# Patient Record
Sex: FEMALE | Race: WHITE | NOT HISPANIC OR LATINO | Employment: UNEMPLOYED | URBAN - METROPOLITAN AREA
[De-identification: names, ages, dates, MRNs, and addresses within clinical notes are randomized per-mention and may not be internally consistent; named-entity substitution may affect disease eponyms.]

---

## 2017-01-12 ENCOUNTER — OFFICE VISIT (OUTPATIENT)
Dept: INTERNAL MEDICINE | Facility: CLINIC | Age: 57
End: 2017-01-12
Payer: COMMERCIAL

## 2017-01-12 ENCOUNTER — HOSPITAL ENCOUNTER (OUTPATIENT)
Dept: RADIOLOGY | Facility: HOSPITAL | Age: 57
Discharge: HOME | End: 2017-01-12
Attending: NURSE PRACTITIONER
Payer: COMMERCIAL

## 2017-01-12 VITALS
SYSTOLIC BLOOD PRESSURE: 138 MMHG | BODY MASS INDEX: 24.3 KG/M2 | HEART RATE: 101 BPM | RESPIRATION RATE: 18 BRPM | OXYGEN SATURATION: 95 % | DIASTOLIC BLOOD PRESSURE: 88 MMHG | HEIGHT: 56 IN | WEIGHT: 108 LBS

## 2017-01-12 DIAGNOSIS — E04.1 THYROID NODULE: Primary | ICD-10-CM

## 2017-01-12 DIAGNOSIS — J02.9 PHARYNGITIS, UNSPECIFIED ETIOLOGY: ICD-10-CM

## 2017-01-12 DIAGNOSIS — E07.89 PAINFUL THYROID: ICD-10-CM

## 2017-01-12 DIAGNOSIS — E07.89 PAINFUL THYROID: Primary | ICD-10-CM

## 2017-01-12 PROCEDURE — 99999 PR PBB SHADOW E&M-EST. PATIENT-LVL III: CPT | Mod: PBBFAC,,, | Performed by: NURSE PRACTITIONER

## 2017-01-12 PROCEDURE — 76536 US EXAM OF HEAD AND NECK: CPT | Mod: 26,,, | Performed by: RADIOLOGY

## 2017-01-12 PROCEDURE — 96372 THER/PROPH/DIAG INJ SC/IM: CPT | Mod: S$GLB,,, | Performed by: NURSE PRACTITIONER

## 2017-01-12 PROCEDURE — 99204 OFFICE O/P NEW MOD 45 MIN: CPT | Mod: S$GLB,,, | Performed by: NURSE PRACTITIONER

## 2017-01-12 PROCEDURE — 1159F MED LIST DOCD IN RCRD: CPT | Mod: S$GLB,,, | Performed by: NURSE PRACTITIONER

## 2017-01-12 PROCEDURE — 76536 US EXAM OF HEAD AND NECK: CPT | Mod: TC

## 2017-01-12 RX ORDER — METHYLPREDNISOLONE ACETATE 80 MG/ML
80 INJECTION, SUSPENSION INTRA-ARTICULAR; INTRALESIONAL; INTRAMUSCULAR; SOFT TISSUE
Status: COMPLETED | OUTPATIENT
Start: 2017-01-12 | End: 2017-01-12

## 2017-01-12 RX ORDER — FERROUS SULFATE, DRIED 160(50) MG
1 TABLET, EXTENDED RELEASE ORAL 2 TIMES DAILY WITH MEALS
COMMUNITY
End: 2017-10-10

## 2017-01-12 RX ORDER — AMOXICILLIN AND CLAVULANATE POTASSIUM 875; 125 MG/1; MG/1
1 TABLET, FILM COATED ORAL 2 TIMES DAILY
Qty: 20 TABLET | Refills: 0 | Status: SHIPPED | OUTPATIENT
Start: 2017-01-12 | End: 2017-01-22

## 2017-01-12 RX ADMIN — METHYLPREDNISOLONE ACETATE 80 MG: 80 INJECTION, SUSPENSION INTRA-ARTICULAR; INTRALESIONAL; INTRAMUSCULAR; SOFT TISSUE at 02:01

## 2017-01-12 NOTE — PROGRESS NOTES
Subjective:       Patient ID: Cynthia Zarco is a 56 y.o. female.    Chief Complaint: Sore Throat    HPI: Pt presents to clinic today new to me and clinic with c/o throat pain. She reports that she started with a pain a couple weeks ago and then in last couple days she reports that it has gotten much worse. She reports that she can not swallow. She reports that she has not nasal congestion. Low grade fever. + fatigue. No wt loss  Review of Systems   Constitutional: Positive for fatigue and fever. Negative for chills.   HENT: Positive for sore throat and trouble swallowing. Negative for congestion, sinus pressure and voice change.    Respiratory: Negative for cough and shortness of breath.    Cardiovascular: Negative for chest pain and palpitations.   Gastrointestinal: Negative for constipation, diarrhea, nausea and vomiting.   Genitourinary: Negative for dysuria, frequency, hematuria and urgency.   Musculoskeletal: Negative for back pain and myalgias.   Skin: Negative for rash and wound.   Neurological: Negative for weakness and light-headedness.   Psychiatric/Behavioral: Negative for agitation and suicidal ideas. The patient is not nervous/anxious.        Objective:      Physical Exam   Constitutional: She is oriented to person, place, and time. She appears well-developed and well-nourished.   HENT:   Head: Normocephalic and atraumatic.   Right Ear: External ear normal.   Left Ear: External ear normal.   Nose: Nose normal.   Mouth/Throat: Oropharynx is clear and moist.   Eyes: Conjunctivae and EOM are normal. Pupils are equal, round, and reactive to light.   Neck: Normal range of motion. Neck supple. Thyromegaly (she has a mildly enlarged thyroid but c/o pain  in specific area that moves up and down with palp during swallow) present.   Cardiovascular: Normal rate, regular rhythm, normal heart sounds and intact distal pulses.    No murmur heard.  Pulmonary/Chest: Effort normal and breath sounds normal. No  respiratory distress. She has no wheezes.   Abdominal: Soft. Bowel sounds are normal. She exhibits no distension and no mass. There is no tenderness. There is no rebound and no guarding.   Musculoskeletal: Normal range of motion. She exhibits no tenderness.   Neurological: She is alert and oriented to person, place, and time.   Skin: Skin is warm and dry.   Psychiatric: She has a normal mood and affect. Her behavior is normal. Judgment and thought content normal.       Assessment:       1. Painful thyroid    2. Pharyngitis, unspecified etiology        Plan:   Cynthia was seen today for sore throat.    Diagnoses and all orders for this visit:    Painful thyroid  -     US Soft Tissue Head Neck Thyroid; Future  -     TSH; Future  -     CBC auto differential; Future  -     Comprehensive metabolic panel; Future    Pharyngitis, unspecified etiology  -     methylPREDNISolone acetate injection 80 mg; Inject 1 mL (80 mg total) into the muscle one time. Still swallowing spit, no breathing difficulty  -     amoxicillin-clavulanate 875-125mg (AUGMENTIN) 875-125 mg per tablet; Take 1 tablet by mouth 2 (two) times daily.  get labs now, spoke with radiology will u/s today at 345

## 2017-01-12 NOTE — MR AVS SNAPSHOT
[facility]  [address]  [place] [address]  Phone: [phone]  Fax: [phone]                  Cynthia Zarco   2017 2:00 PM   Office Visit    Description:  Female : 1960   Provider:  Sanaz Mcknight NP   Department:  [facility]           Reason for Visit     Sore Throat           Diagnoses this Visit        Comments    Painful thyroid    -  Primary     Pharyngitis, unspecified etiology                To Do List           Future Appointments        Provider Department Dept Phone    2017 3:45 PM [facility] US[facility] [phone]      Goals (5 Years of Data)     None      Follow-Up and Disposition     Return if symptoms worsen or fail to improve.    Follow-up and Disposition History       These Medications        Disp Refills Start End    amoxicillin-clavulanate 875-125mg (AUGMENTIN) 875-125 mg per tablet 20 tablet 0 2017    Take 1 tablet by mouth 2 (two) times daily. - Oral    Pharmacy: [facility]/pharmacy #[id number] - [place] - [address] [address] Ph #: [phone]         [facility]     [facility] Care [facility] -  Assistance  Registered nurses in the [facility] provide clinical advisement, health education, appointment booking, and other advisory services.  Call for this free service at [phone].             Medications           Message regarding Medications     Verify the changes and/or additions to your medication regime listed below are the same as discussed with your clinician today.  If any of these changes or additions are incorrect, please notify your healthcare provider.        START taking these NEW medications        Refills    amoxicillin-clavulanate 875-125mg (AUGMENTIN) 875-125 mg per tablet 0    Sig: Take 1 tablet by mouth 2 (two) times daily.    Class: Normal    Route: Oral      These medications were administered today        Dose Freq    methylPREDNISolone acetate  "injection 80 mg 80 mg Clinic/HOD 1 time    Sig: Inject 1 mL (80 mg total) into the muscle one time.    Class: Normal    Route: Intramuscular           Verify that the below list of medications is an accurate representation of the medications you are currently taking.  If none reported, the list may be blank. If incorrect, please contact your healthcare provider. Carry this list with you in case of emergency.           Current Medications     calcium-vitamin D3 (CALCIUM 500 + D) 500 mg(1,250mg) -200 unit per tablet Take 1 tablet by mouth 2 (two) times daily with meals.    multivitamin (THERAGRAN) per tablet Take by mouth. 1 Tablet Oral Every day    amoxicillin-clavulanate 875-125mg (AUGMENTIN) 875-125 mg per tablet Take 1 tablet by mouth 2 (two) times daily.    ESTRACE 0.01 % (0.1 mg/gram) vaginal cream Place 1 g vaginally once daily.           Clinical Reference Information           Vital Signs - Last Recorded  Most recent update: 1/12/2017  2:11 PM by Theresa Torres MA    BP Pulse Resp Ht Wt LMP    138/88 101 18 4' 8" (1.422 m) 49 kg (108 lb 0.4 oz) 08/22/2008    SpO2 BMI             95% 24.22 kg/m2         Blood Pressure          Most Recent Value    BP  138/88      Allergies as of 1/12/2017     No Known Allergies      Immunizations Administered on Date of Encounter - 1/12/2017     None      Orders Placed During Today's Visit     Future Labs/Procedures Expected by Expires    CBC auto differential  1/12/2017 (Approximate) 1/12/2018    Comprehensive metabolic panel  1/12/2017 (Approximate) 1/12/2018    TSH  1/12/2017 (Approximate) 1/12/2018    US Soft Tissue Head Neck Thyroid  1/12/2017 1/12/2018      "

## 2017-01-13 ENCOUNTER — PATIENT MESSAGE (OUTPATIENT)
Dept: INTERNAL MEDICINE | Facility: CLINIC | Age: 57
End: 2017-01-13

## 2017-01-16 ENCOUNTER — PATIENT MESSAGE (OUTPATIENT)
Dept: INTERNAL MEDICINE | Facility: CLINIC | Age: 57
End: 2017-01-16

## 2017-05-09 ENCOUNTER — OFFICE VISIT (OUTPATIENT)
Dept: OBSTETRICS AND GYNECOLOGY | Facility: CLINIC | Age: 57
End: 2017-05-09
Payer: COMMERCIAL

## 2017-05-09 VITALS
DIASTOLIC BLOOD PRESSURE: 86 MMHG | HEIGHT: 56 IN | WEIGHT: 110 LBS | HEART RATE: 75 BPM | SYSTOLIC BLOOD PRESSURE: 122 MMHG | BODY MASS INDEX: 24.75 KG/M2 | RESPIRATION RATE: 13 BRPM

## 2017-05-09 DIAGNOSIS — Z01.419 WELL WOMAN EXAM WITH ROUTINE GYNECOLOGICAL EXAM: Primary | ICD-10-CM

## 2017-05-09 DIAGNOSIS — Z12.31 ENCOUNTER FOR SCREENING MAMMOGRAM FOR BREAST CANCER: ICD-10-CM

## 2017-05-09 DIAGNOSIS — R03.0 ELEVATED BP WITHOUT DIAGNOSIS OF HYPERTENSION: ICD-10-CM

## 2017-05-09 DIAGNOSIS — Z71.89 COUNSELING FOR HORMONE REPLACEMENT THERAPY: ICD-10-CM

## 2017-05-09 DIAGNOSIS — Z12.4 CERVICAL CANCER SCREENING: ICD-10-CM

## 2017-05-09 PROCEDURE — 99396 PREV VISIT EST AGE 40-64: CPT | Mod: S$GLB,,, | Performed by: OBSTETRICS & GYNECOLOGY

## 2017-05-09 PROCEDURE — 87624 HPV HI-RISK TYP POOLED RSLT: CPT

## 2017-05-09 PROCEDURE — 88175 CYTOPATH C/V AUTO FLUID REDO: CPT

## 2017-05-09 PROCEDURE — 99999 PR PBB SHADOW E&M-EST. PATIENT-LVL III: CPT | Mod: PBBFAC,,, | Performed by: OBSTETRICS & GYNECOLOGY

## 2017-05-09 RX ORDER — HYDROCHLOROTHIAZIDE 12.5 MG/1
12.5 CAPSULE ORAL DAILY
Qty: 15 CAPSULE | Refills: 1 | Status: SHIPPED | OUTPATIENT
Start: 2017-05-09 | End: 2017-10-10

## 2017-05-09 RX ORDER — HYDROCHLOROTHIAZIDE 12.5 MG/1
12.5 CAPSULE ORAL DAILY
COMMUNITY
End: 2017-05-09 | Stop reason: SDUPTHER

## 2017-05-09 NOTE — PROGRESS NOTES
Subjective:    Patient ID: Cynthia Zarco is a 57 y.o. y.o. female.     Chief Complaint: Annual Well Woman Exam     History of Present Illness:  Cynthia presents today for Annual Well Woman exam. She describes her menses as postmenopausal greater than 10 years with no PMB.She denies pelvic pain.  She denies breast tenderness, masses, nipple discharge. She denies difficulty with urination or bowel movements. She reports menopausal symptoms such as hotflashes, vaginal dryness with dyspareunia, and night sweats. She denies bloating, early satiety, or weight changes. She is sexually active.   She is interested in bio identical hormone pellets.    Menstrual History:   Patient's last menstrual period was 2008..     OB History      Para Term  AB TAB SAB Ectopic Multiple Living    2 2 2                 The following portions of the patient's history were reviewed and updated as appropriate: allergies, current medications, past family history, past medical history, past social history, past surgical history and problem list.    ROS:   CONSTITUTIONAL: Negative for fever, chills, diaphoresis, weakness, fatigue, weight loss, weight gain  ENT: negative for sore throat, nasal congestion, nasal discharge, epistaxis, tinnitus, hearing loss  EYES: negative for blurry vision, decreased vision, loss of vision, eye pain, diplopia, photophobia, discharge  SKIN: Negative for rash, itching, hives  RESPIRATORY: negative for cough, hemoptysis, shortness of breath, pleuritic chest pain, wheezing  CARDIOVASCULAR: negative for chest pain, dyspnea on exertion, orthopnea, paroxysmal nocturnal dyspnea, edema, palpitations  BREAST: history of accessory mammary tissue in right axilla that is stable for many years; and negative for breast  tenderness, breast mass, nipple discharge, or skin changes  GASTROINTESTINAL: negative for abdominal pain, flank pain, nausea, vomiting, diarrhea, constipation, black stool, blood in  stool  GENITOURINARY: dyspareunia and decreased libido, negative for abnormal vaginal bleeding, amenorrhea, dysuria, genital sores, hematuria, incontinence, menorrhagia, pelvic pain, urinary frequency, vaginal discharge  HEMATOLOGIC/LYMPHATIC: negative for swollen lymph nodes, bleeding, bruising  MUSCULOSKELETAL: negative for back pain, joint pain, joint stiffness, joint swelling, muscle pain, muscle weakness  NEUROLOGICAL: negative for dizzy/vertigo, headache, focal weakness, numbness/tingling, speech problems, loss of consciousness, confusion, memory loss  BEHAVORIAL/PSYCH: negative for anxiety, depression, psychosis  ENDOCRINE: negative for polydipsia/polyuria, palpitations, skin changes, temperature intolerance, unexpected weight changes  ALLERGIC/IMMUNOLOGIC: negative for urticaria, hay fever, angioedema      Objective:    Vital Signs:  Vitals:    05/09/17 0840   BP: 122/86   Pulse: 75   Resp: 13       Physical Exam:  General:  alert, cooperative, appears stated age, no distress   Skin:  Skin color, texture, turgor normal. No rashes or lesions   HEENT:  extra ocular movement intact, sclera clear, anicteric   Neck: supple, trachea midline, no adenopathy or thyromegally   Respiratory:  Normal effort   Breasts:  no discharge, erythema, or tenderness, no skin dimpling or peau d'orange, small 2 cm suspected accessory breast tissue in right axilla, nontender   Abdomen:  soft, nontender, no palpable masses   Pelvis: External genitalia: normal general appearance  Urinary system: urethral meatus normal, bladder nontender  Vaginal: normal without tenderness, induration or masses, atrophic mucosa  Cervix: normal appearance, atrophic  Uterus: normal size, shape, position  Adnexa: normal size, nontender bilaterally   Extremities: Normal ROM; no edema, no cyanosis   Neurologial: Normal strength and tone. No focal numbness or weakness   Psychiatric: normal mood, speech, dress, and thought processes         Assessment:        Healthy female exam.     1. Well woman exam with routine gynecological exam    2. Cervical cancer screening    3. Encounter for screening mammogram for breast cancer    4. Elevated BP without diagnosis of hypertension    5. Counseling for hormone replacement therapy          Plan:      1. Well woman exam with routine gynecological exam    2. Cervical cancer screening  - Liquid-based pap smear, screening  - HPV High Risk Genotypes, PCR    3. Encounter for screening mammogram for breast cancer  - Mammo Digital Screening Bilat with CAD; Future    4. Elevated BP without diagnosis of hypertension  Patient uses HCTZ about once per month as needed.      5. Counseling for hormone replacement therapy  A full discussion of the benefit-risk ratio of hormonal replacement therapy was carried out. Improvement in vasomotor and other climacteric symptoms were discussed, including possible improvements in sleep and mood. Reduction of risk for osteoporosis was explained. We discussed the study data showing increased risk of thrombo-embolic events such as myocardial infarction, stroke and also possibly breast cancer with estrogen replacement, and how this might affect her. The range of side effects such as breast tenderness, weight gain and including possible increases in lifetime risk of breast cancer and possible thrombotic complications was discussed. We also discussed ACOG's recommendation to use hormone replacement therapy for the relief of hot flashes alone and to be on the lowest dose possible for the shortest amount of time.  Alternative such as herbal and soy-based products were reviewed as well as behavioral modifications, bio identical hormones, and non hormonal prescription medications. All of her questions about this therapy were answered. She will contact a doctor in [place] for possible insertion of hormone pellets.          COUNSELING:  Cynthia was counseled on STD pevention, use and side-effects of various contraceptive  measures, A.C.O.G. Pap guidelines and recommendations for yearly pelvic exams in addition to recommendations for monthly self breast exams; to see her PCP for other health maintenance.

## 2017-05-15 LAB
HPV16 DNA SPEC QL NAA+PROBE: NEGATIVE
HPV16+18+H RISK 12 DNA CVX-IMP: NEGATIVE
HPV18 DNA SPEC QL NAA+PROBE: NEGATIVE

## 2017-05-25 ENCOUNTER — HOSPITAL ENCOUNTER (OUTPATIENT)
Dept: RADIOLOGY | Facility: HOSPITAL | Age: 57
Discharge: HOME | End: 2017-05-25
Attending: OBSTETRICS & GYNECOLOGY
Payer: COMMERCIAL

## 2017-05-25 DIAGNOSIS — Z12.31 ENCOUNTER FOR SCREENING MAMMOGRAM FOR BREAST CANCER: ICD-10-CM

## 2017-05-25 PROCEDURE — 77067 SCR MAMMO BI INCL CAD: CPT | Mod: TC

## 2017-05-25 PROCEDURE — 77067 SCR MAMMO BI INCL CAD: CPT | Mod: 26,,, | Performed by: RADIOLOGY

## 2017-09-28 ENCOUNTER — LAB VISIT (OUTPATIENT)
Dept: LAB | Facility: HOSPITAL | Age: 57
End: 2017-09-28
Attending: OTOLARYNGOLOGY
Payer: COMMERCIAL

## 2017-09-28 DIAGNOSIS — E20.9 HYPOPARATHYROIDISM: Primary | ICD-10-CM

## 2017-09-28 LAB
CALCIUM SERPL-MCNC: 9.2 MG/DL
PTH-INTACT SERPL-MCNC: 47 PG/ML

## 2017-09-28 PROCEDURE — 36415 COLL VENOUS BLD VENIPUNCTURE: CPT

## 2017-09-28 PROCEDURE — 82310 ASSAY OF CALCIUM: CPT

## 2017-09-28 PROCEDURE — 83970 ASSAY OF PARATHORMONE: CPT

## 2017-10-10 ENCOUNTER — PROCEDURE VISIT (OUTPATIENT)
Dept: OBSTETRICS AND GYNECOLOGY | Facility: CLINIC | Age: 57
End: 2017-10-10
Payer: COMMERCIAL

## 2017-10-10 ENCOUNTER — OFFICE VISIT (OUTPATIENT)
Dept: OBSTETRICS AND GYNECOLOGY | Facility: CLINIC | Age: 57
End: 2017-10-10
Payer: COMMERCIAL

## 2017-10-10 VITALS
RESPIRATION RATE: 13 BRPM | BODY MASS INDEX: 25.01 KG/M2 | HEART RATE: 81 BPM | DIASTOLIC BLOOD PRESSURE: 94 MMHG | WEIGHT: 111.19 LBS | SYSTOLIC BLOOD PRESSURE: 152 MMHG | HEIGHT: 56 IN

## 2017-10-10 DIAGNOSIS — N95.0 POSTMENOPAUSAL BLEEDING: Primary | ICD-10-CM

## 2017-10-10 DIAGNOSIS — R93.89 INCREASED ENDOMETRIAL STRIPE THICKNESS: ICD-10-CM

## 2017-10-10 DIAGNOSIS — Z79.890 POSTMENOPAUSAL HRT (HORMONE REPLACEMENT THERAPY): ICD-10-CM

## 2017-10-10 DIAGNOSIS — N95.0 POSTMENOPAUSAL BLEEDING: ICD-10-CM

## 2017-10-10 PROCEDURE — 76830 TRANSVAGINAL US NON-OB: CPT | Mod: S$GLB,,, | Performed by: OBSTETRICS & GYNECOLOGY

## 2017-10-10 PROCEDURE — 88305 TISSUE EXAM BY PATHOLOGIST: CPT | Performed by: PATHOLOGY

## 2017-10-10 PROCEDURE — 99999 PR PBB SHADOW E&M-EST. PATIENT-LVL III: CPT | Mod: PBBFAC,,, | Performed by: OBSTETRICS & GYNECOLOGY

## 2017-10-10 PROCEDURE — 58100 BIOPSY OF UTERUS LINING: CPT | Mod: S$GLB,,, | Performed by: OBSTETRICS & GYNECOLOGY

## 2017-10-10 PROCEDURE — 99213 OFFICE O/P EST LOW 20 MIN: CPT | Mod: 25,S$GLB,, | Performed by: OBSTETRICS & GYNECOLOGY

## 2017-10-10 RX ORDER — HYDROCHLOROTHIAZIDE 12.5 MG/1
TABLET ORAL
Refills: 1 | COMMUNITY
Start: 2017-08-23 | End: 2017-10-10

## 2017-10-10 RX ORDER — SPIRONOLACTONE 25 MG/1
25 TABLET ORAL 2 TIMES DAILY
Refills: 3 | COMMUNITY
Start: 2017-08-23 | End: 2024-02-07 | Stop reason: SDUPTHER

## 2017-10-10 RX ORDER — PROGESTERONE 200 MG/1
CAPSULE ORAL
Refills: 2 | COMMUNITY
Start: 2017-10-06 | End: 2023-12-12

## 2017-10-10 RX ORDER — THYROID, PORCINE 30 MG/1
TABLET ORAL
Refills: 3 | COMMUNITY
Start: 2017-09-28 | End: 2024-01-16

## 2017-10-10 NOTE — PROGRESS NOTES
"Subjective:    Patient ID: Cynthia Zarco is a 57 y.o. y.o. female.     Chief Complaint:   Chief Complaint   Patient presents with    Vaginal Bleeding     for 12-14 days before having new hrt pellet replaced, pt stated she had more bleeding after having the new pellet put in       History of Present Illness   Cynthia presents today complaining of abnormal vaginal bleeding. Patient has been menopausal since mid 40s with no episodes of PMB.  She reports getting hormone pellets (estrogen and testosterone) with progesterone supplementation in July 2017 inserted by her dermatologist. She reports that estrogen levels were still low, so received an "estrogen boost pellet."  She states that her bleeding began soon after this pellet and was heavy. Bleeding resolved.    She had additional estrogen and testosterone pellets placed recently and began having bleeding again.      ROS:   Review of Systems   Constitutional: Negative for chills, diaphoresis, fatigue, fever and unexpected weight change.   HENT: Negative for congestion, hearing loss, rhinorrhea and sore throat.    Eyes: Negative for pain, discharge and visual disturbance.   Respiratory: Negative for apnea, cough, shortness of breath and wheezing.    Cardiovascular: Negative for chest pain, palpitations and leg swelling.   Gastrointestinal: Negative for abdominal pain, constipation, diarrhea, nausea and vomiting.   Endocrine: Negative for cold intolerance and heat intolerance.   Genitourinary: Positive for menstrual problem and vaginal bleeding. Negative for difficulty urinating, dyspareunia, dysuria, flank pain, frequency, genital sores, hematuria, pelvic pain, vaginal discharge and vaginal pain.   Musculoskeletal: Negative for arthralgias, back pain and joint swelling.   Skin: Negative for rash.   Neurological: Negative for dizziness, weakness, light-headedness, numbness and headaches.   Psychiatric/Behavioral: Negative for agitation and confusion. The patient is " not nervous/anxious.           Objective:    Vital Signs:  Vitals:    10/10/17 1011   BP: (!) 152/94   Pulse: 81   Resp: 13       Physical Exam:  General:  alert, cooperative, appears stated age, no distress   Skin:  Skin color, texture, turgor normal. No rashes or lesions   Abdomen:  soft, nontender   Pelvis: External genitalia: normal general appearance  Urinary system: urethral meatus normal, bladder nontender  Vaginal: normal mucosa without prolapse or lesions  Cervix: normal appearance  Uterus: normal size, shape, position  Adnexa: normal size, nontender bilaterally       Endometrial Biopsy:      The risks of uterine hyperplasia, neoplasia, and cancer were explained to the patient, as well as the likelihood of abnormal or anovulatory bleeding. She does not have a medical condition, history of anticoagulation, or other evident reason for bleeding at this time.  The rationale for evaluation with endometrial biopsy and pelvic ultrasound was discussed, and she verbalized understanding. She was informed of the risk of bleeding, infection, uterine perforation and pain and that the test will rule out endometrial cancer with accuracy greater than 95%.  She was counseled on the alternatives to endometrial biopsy and agrees to proceed.    TIMEOUT PERFORMED.  The cervix was visualized with a speculum.  The cervix was prepped with Betadine.    The cervix was not stenotic. A single-tooth tenaculum was not used to stabilize the cervix. Cervical dilation was not required. A Pipelle was used to obtain the endometrial biopsy. The uterus was sounded to 7 cm. Endocervical curettage was not performed. Multiple passes were taken with the Pipelle with retrieval of Fair tissue sample.    The specimen was placed in formalin and sent to pathology for histology evaluation.      She tolerated the procedure well        Assessment:      1. Postmenopausal bleeding    2. Postmenopausal HRT (hormone replacement therapy)    3. Increased  endometrial stripe thickness          Plan:      Postmenopausal bleeding  -     US OB/GYN Procedure ([facility]) - Extended List; Future  -     Tissue Specimen To Pathology, Obstetrics/Gynecology    Postmenopausal HRT (hormone replacement therapy)    Increased endometrial stripe thickness  -     Tissue Specimen To Pathology, Obstetrics/Gynecology        Ultrasound with mildly thickened endometrial stripe at .48cm.  Discussed that this is just above normal cut off of .4 cm and biopsy is recommended.      EMB performed in clinic today with fair amount of tissue, consistent with EMS visualized on ultrasound.     Will determine management based on results.      Pt has pellets in place per Dr. Montgomery, discussed importance of continued supplemental progesterone during work up for PMB.

## 2018-10-04 ENCOUNTER — TELEPHONE (OUTPATIENT)
Dept: OBSTETRICS AND GYNECOLOGY | Facility: CLINIC | Age: 58
End: 2018-10-04

## 2018-10-04 DIAGNOSIS — Z12.31 ENCOUNTER FOR SCREENING MAMMOGRAM FOR BREAST CANCER: Primary | ICD-10-CM

## 2018-10-04 NOTE — TELEPHONE ENCOUNTER
----- Message from Nicolasa Batres sent at 10/4/2018 10:19 AM CDT -----  Contact: Self  Cynthia Zarco  MRN: [id number]  Home Phone      [phone]  Work Phone      Not on file.  Mobile          [phone]    Patient Care Team:  Timmy Tong MD as PCP - General (Family Medicine)  OB? No  What phone number can you be reached at?   Message:   Please link mammogram orders. Thanks.

## 2018-11-14 ENCOUNTER — OFFICE VISIT (OUTPATIENT)
Dept: OBSTETRICS AND GYNECOLOGY | Facility: CLINIC | Age: 58
End: 2018-11-14
Payer: COMMERCIAL

## 2018-11-14 ENCOUNTER — HOSPITAL ENCOUNTER (OUTPATIENT)
Dept: RADIOLOGY | Facility: HOSPITAL | Age: 58
Discharge: HOME | End: 2018-11-14
Attending: OBSTETRICS & GYNECOLOGY
Payer: COMMERCIAL

## 2018-11-14 VITALS
HEIGHT: 56 IN | SYSTOLIC BLOOD PRESSURE: 122 MMHG | BODY MASS INDEX: 25.28 KG/M2 | HEART RATE: 82 BPM | DIASTOLIC BLOOD PRESSURE: 82 MMHG | RESPIRATION RATE: 12 BRPM | WEIGHT: 112.38 LBS

## 2018-11-14 VITALS — WEIGHT: 111 LBS | HEIGHT: 56 IN | BODY MASS INDEX: 24.97 KG/M2

## 2018-11-14 DIAGNOSIS — Z01.419 ENCOUNTER FOR GYNECOLOGICAL EXAMINATION WITHOUT ABNORMAL FINDING: Primary | ICD-10-CM

## 2018-11-14 DIAGNOSIS — N39.46 URGE AND STRESS INCONTINENCE: ICD-10-CM

## 2018-11-14 DIAGNOSIS — Z12.31 ENCOUNTER FOR SCREENING MAMMOGRAM FOR BREAST CANCER: ICD-10-CM

## 2018-11-14 DIAGNOSIS — E28.319 EARLY ONSET MENOPAUSE: ICD-10-CM

## 2018-11-14 DIAGNOSIS — Z13.820 SCREENING FOR OSTEOPOROSIS: ICD-10-CM

## 2018-11-14 PROCEDURE — 77063 BREAST TOMOSYNTHESIS BI: CPT | Mod: TC

## 2018-11-14 PROCEDURE — 77067 SCR MAMMO BI INCL CAD: CPT | Mod: 26,,, | Performed by: RADIOLOGY

## 2018-11-14 PROCEDURE — 99396 PREV VISIT EST AGE 40-64: CPT | Mod: S$GLB,,, | Performed by: OBSTETRICS & GYNECOLOGY

## 2018-11-14 PROCEDURE — 77063 BREAST TOMOSYNTHESIS BI: CPT | Mod: 26,,, | Performed by: RADIOLOGY

## 2018-11-14 PROCEDURE — 77067 SCR MAMMO BI INCL CAD: CPT | Mod: TC

## 2018-11-14 PROCEDURE — 99999 PR PBB SHADOW E&M-EST. PATIENT-LVL III: CPT | Mod: PBBFAC,,, | Performed by: OBSTETRICS & GYNECOLOGY

## 2018-11-14 RX ORDER — OXYBUTYNIN CHLORIDE 10 MG/1
10 TABLET, EXTENDED RELEASE ORAL DAILY
Qty: 30 TABLET | Refills: 2 | Status: SHIPPED | OUTPATIENT
Start: 2018-11-14 | End: 2019-03-08 | Stop reason: SDUPTHER

## 2018-11-14 NOTE — PROGRESS NOTES
Subjective:    Patient ID: Cynthia Zarco is a 58 y.o. y.o. female.     Chief Complaint: Annual Well Woman Exam     History of Present Illness:  Cynthia presents today for Annual Well Woman exam. She describes her menses as absent, she has not had any additional postmenopausal bleeding since last year when she was evaluated with negative EMB.She denies pelvic pain.  She denies breast tenderness, masses, nipple discharge. She reports difficulty with urination and is having increased leaking through the day.  She reports menopausal symptoms are well controlled with hormone pellets. She denies bloating, early satiety, or weight changes.   She had her mammogram done this am.   She has never had a DEXA but was menopausal at age 43.    Menstrual History:   Patient's last menstrual period was 2008..     OB History      Para Term  AB Living    2 2 2          SAB TAB Ectopic Multiple Live Births                       The following portions of the patient's history were reviewed and updated as appropriate: allergies, current medications, past family history, past medical history, past social history, past surgical history and problem list.    ROS:   CONSTITUTIONAL: fatigue, Denies: fever, chills, diaphoresis, weight loss, weight gain  ENT: negative for sore throat, nasal congestion, nasal discharge, epistaxis, tinnitus, hearing loss  EYES: negative for blurry vision, decreased vision, loss of vision, eye pain, diplopia, photophobia, discharge  SKIN: hair growth, Negative for rash, itching, hives  RESPIRATORY: negative for cough, hemoptysis, shortness of breath, pleuritic chest pain, wheezing  CARDIOVASCULAR: negative for chest pain, dyspnea on exertion, orthopnea, paroxysmal nocturnal dyspnea, edema, palpitations  BREAST: negative for breast  tenderness, breast mass, nipple discharge, or skin changes  GASTROINTESTINAL: negative for abdominal pain, flank pain, nausea, vomiting, diarrhea, constipation,  black stool, blood in stool  GENITOURINARY: negative for abnormal vaginal bleeding, amenorrhea, decreased libido, dysuria, genital sores, hematuria, incontinence, menorrhagia, pelvic pain, urinary frequency, vaginal discharge  HEMATOLOGIC/LYMPHATIC: negative for swollen lymph nodes, bleeding, bruising  MUSCULOSKELETAL: joint pain, Denies: back pain, joint swelling, muscle pain, muscle weakness  NEUROLOGICAL: negative for dizzy/vertigo, headache, focal weakness, numbness/tingling, speech problems, loss of consciousness, confusion, memory loss  BEHAVORIAL/PSYCH: negative for anxiety, depression, psychosis  ENDOCRINE: negative for polydipsia/polyuria, palpitations, skin changes, temperature intolerance, unexpected weight changes  ALLERGIC/IMMUNOLOGIC: negative for urticaria, hay fever, angioedema      Objective:    Vital Signs:  Vitals:    11/14/18 0944   BP: 122/82   Pulse: 82   Resp: 12       Physical Exam:  General:  alert, cooperative, no distress   Skin:  Skin color, texture, turgor normal. No rashes or lesions   HEENT:  extra ocular movement intact, sclera clear, anicteric   Neck: supple, trachea midline, no adenopathy or thyromegally   Respiratory:  Normal effort   Breasts:  no discharge, erythema, tenderness, or palpable masses; no axillary lymphadenopathy   Abdomen:  soft, nontender, no palpable masses   Pelvis: External genitalia: normal general appearance  Urinary system: urethral meatus normal, bladder nontender  Vaginal: normal mucosa without prolapse or lesions  Cervix: normal appearance  Uterus: normal size, shape, position  Adnexa: normal size, nontender bilaterally   Extremities: Normal ROM; no edema, no cyanosis   Neurologial: Normal strength and tone. No focal numbness or weakness.   Psychiatric: normal mood, speech, dress, and thought processes         Assessment:       Healthy female exam.     1. Encounter for gynecological examination without abnormal finding    2. Urge and stress incontinence     3. Screening for osteoporosis    4. Early onset menopause          Plan:      Encounter for gynecological examination without abnormal finding    Urge and stress incontinence  -     oxybutynin (DITROPAN XL) 10 MG 24 hr tablet; Take 1 tablet (10 mg total) by mouth once daily.  Dispense: 30 tablet; Refill: 2    Screening for osteoporosis  -     DXA Bone Density Spine And Hip; Future; Expected date: 11/14/2018    Early onset menopause  -     DXA Bone Density Spine And Hip; Future; Expected date: 11/14/2018      Pap/HPV negative (2017); benign exam today    COUNSELING:  Cynthia was counseled on A.C.O.G. Pap guidelines and recommendations for yearly pelvic exams in addition to recommendations for monthly self breast exams; to see her PCP for other health maintenance.

## 2018-11-27 ENCOUNTER — TELEPHONE (OUTPATIENT)
Dept: OBSTETRICS AND GYNECOLOGY | Facility: CLINIC | Age: 58
End: 2018-11-27

## 2018-11-27 NOTE — TELEPHONE ENCOUNTER
----- Message from Alma Delia Alfredo sent at 11/27/2018 11:17 AM CST -----  Contact: self   Cynthia Zarco  MRN: [id number]  Home Phone  [phone]  Work Phone Not on file.  Mobile [phone]    Patient Care Team:  Timmy Tong MD as PCP - General (Family Medicine)  OB? No  What phone number can you be reached at? [phone]  Message: Pt would like to speak to the nurse regarding a bone density.

## 2018-12-13 ENCOUNTER — HOSPITAL ENCOUNTER (OUTPATIENT)
Dept: RADIOLOGY | Facility: HOSPITAL | Age: 58
Discharge: HOME | End: 2018-12-13
Attending: OBSTETRICS & GYNECOLOGY
Payer: COMMERCIAL

## 2018-12-13 DIAGNOSIS — E28.319 EARLY ONSET MENOPAUSE: ICD-10-CM

## 2018-12-13 DIAGNOSIS — Z13.820 SCREENING FOR OSTEOPOROSIS: ICD-10-CM

## 2018-12-13 PROCEDURE — 77080 DXA BONE DENSITY AXIAL: CPT | Mod: TC

## 2018-12-16 ENCOUNTER — HOSPITAL ENCOUNTER (EMERGENCY)
Facility: HOSPITAL | Age: 58
Discharge: HOME | End: 2018-12-16
Attending: SURGERY
Payer: COMMERCIAL

## 2018-12-16 VITALS
WEIGHT: 112 LBS | OXYGEN SATURATION: 98 % | HEART RATE: 87 BPM | SYSTOLIC BLOOD PRESSURE: 155 MMHG | TEMPERATURE: 97 F | DIASTOLIC BLOOD PRESSURE: 72 MMHG | BODY MASS INDEX: 25.11 KG/M2 | RESPIRATION RATE: 16 BRPM

## 2018-12-16 DIAGNOSIS — M79.642 LEFT HAND PAIN: ICD-10-CM

## 2018-12-16 PROCEDURE — 96372 THER/PROPH/DIAG INJ SC/IM: CPT

## 2018-12-16 PROCEDURE — 99284 EMERGENCY DEPT VISIT MOD MDM: CPT | Mod: 25

## 2018-12-16 PROCEDURE — 63600175 PHARM REV CODE 636 W HCPCS: Performed by: SURGERY

## 2018-12-16 PROCEDURE — 29125 APPL SHORT ARM SPLINT STATIC: CPT

## 2018-12-16 RX ORDER — CYCLOBENZAPRINE HCL 10 MG
10 TABLET ORAL 3 TIMES DAILY PRN
Qty: 10 TABLET | Refills: 0 | Status: SHIPPED | OUTPATIENT
Start: 2018-12-16 | End: 2018-12-21

## 2018-12-16 RX ORDER — ORPHENADRINE CITRATE 30 MG/ML
60 INJECTION INTRAMUSCULAR; INTRAVENOUS
Status: COMPLETED | OUTPATIENT
Start: 2018-12-16 | End: 2018-12-16

## 2018-12-16 RX ORDER — IBUPROFEN 800 MG/1
800 TABLET ORAL EVERY 6 HOURS PRN
Qty: 20 TABLET | Refills: 0 | Status: SHIPPED | OUTPATIENT
Start: 2018-12-16 | End: 2023-12-12

## 2018-12-16 RX ORDER — KETOROLAC TROMETHAMINE 30 MG/ML
60 INJECTION, SOLUTION INTRAMUSCULAR; INTRAVENOUS
Status: COMPLETED | OUTPATIENT
Start: 2018-12-16 | End: 2018-12-16

## 2018-12-16 RX ORDER — METHYLPREDNISOLONE 4 MG/1
TABLET ORAL
Qty: 1 PACKAGE | Refills: 0 | Status: SHIPPED | OUTPATIENT
Start: 2018-12-16 | End: 2023-12-12

## 2018-12-16 RX ADMIN — KETOROLAC TROMETHAMINE 60 MG: 30 INJECTION, SOLUTION INTRAMUSCULAR at 10:12

## 2018-12-16 RX ADMIN — ORPHENADRINE CITRATE 60 MG: 30 INJECTION INTRAMUSCULAR; INTRAVENOUS at 10:12

## 2018-12-16 NOTE — ED PROVIDER NOTES
[facility]                                                 Chief Complaint  58 y.o. female with Hand Pain    History of Present Illness  Cynthia Zarco presents to the emergency room with left hand pain today  Stay that she has had tendinitis surgery in the bilateral hands 2 years ago  Patient states she has left thumb area pain, denies any trauma on ER ROS  Patient states that she feels that the tendon of the thumb is inflamed today  Patient does work with her hands all day, began to bother her last week  X-rays in the emergency room are negative for fracture/acute pathology  Good distal pulses and capillary refill, neurovascular intact on evaluation    The history is provided by the patient   device was not used during this ER visit  Medical history: Abnormal Pap smear  Surgeries: Tendinitis surgery and tubal ligation  No Known Allergies     Review of Systems and Physical Exam      Review of Systems  -- Constitution - no fever, denies fatigue, no weakness, no chills  -- Eyes - no tearing or redness, no visual disturbance  -- Ear, Nose - no tinnitus or earache, no nasal congestion or discharge  -- Mouth,Throat - no sore throat, no toothache, normal voice, normal swallowing  -- Respiratory - denies cough and congestion, no shortness of breath, no DAMON  -- Cardiovascular - denies chest pain, no palpitations, denies claudication  -- Gastrointestinal - denies abdominal pain, nausea, vomiting, or diarrhea  -- Genitourinary - no dysuria, no hematuria, no flank pain, no bladder pain  -- Musculoskeletal - left hand pain, denies trauma or fall  -- Neurological - no headache, denies weakness or seizure; no LOC  -- Skin - denies pallor, rash, or changes in skin. no hives or welts noted    Vital Signs  Her oral temperature is 97.3 °F (36.3 °C).   Her blood pressure is 155/72 and her pulse is 87.   Her respiration is 16 and oxygen saturation is 98%.     Physical Exam  -- Nursing note and  vitals reviewed  -- Constitutional: Appears well-developed and well-nourished  -- Head: Atraumatic. Normocephalic. No obvious abnormality  -- Eyes: Pupils are equal and reactive to light. Normal conjunctiva and lids  -- Nose: Nose normal in appearance, nares grossly normal. No discharge  -- Throat: Mucous membranes moist, pharynx normal, normal tonsils. No lesions   -- Ears: External ears and TM normal bilaterally. Normal hearing and no drainage  -- Neck: Normal range of motion. Neck supple. No masses, trachea midline  -- Cardiac: Normal rate, regular rhythm and normal heart sounds  -- Pulmonary: Normal respiratory effort, breath sounds clear to auscultation  -- Abdominal: Soft, no tenderness. Normal bowel sounds. Normal liver edge  -- Musculoskeletal: Normal range of motion, no effusions. Joints stable   -- Neurological: No focal deficits. Showed good interaction with staff  -- Skin: Warm and dry. No evidence of rash or cellulitis    Emergency Room Course      Treatment and Evaluation  -- Preliminary ER x-ray readings showed no evidence of fracture or dislocation  -- All x-rays are reviewed with a final disposition given by the radiologist  -- Left thumb spica applied by the ER physician  -- IM 60 mg Toradol given today in the ER  -- IM 60 mg Norflex given today in the ER    Diagnosis  -- The encounter diagnosis was Left hand pain.    Disposition and Plan  -- Disposition: home  -- Condition: stable  -- Follow-up: Patient to follow up with MD in 1-2 days.  -- I advised the patient that we have found no life threatening condition today  -- At this time, I believe the patient is clinically stable for discharge.   -- The patient acknowledges that close follow up with a MD is required   -- Patient agrees to comply with all instruction and direction given in the ER    This note is dictated on M*Modal word recognition program.  There are word recognition mistakes that are occasionally missed on review.          Chano YEBOAH  MD Jp  12/16/18 9783

## 2018-12-17 ENCOUNTER — PES CALL (OUTPATIENT)
Dept: ADMINISTRATIVE | Facility: CLINIC | Age: 58
End: 2018-12-17

## 2019-03-08 DIAGNOSIS — N39.46 URGE AND STRESS INCONTINENCE: ICD-10-CM

## 2019-03-08 RX ORDER — OXYBUTYNIN CHLORIDE 10 MG/1
10 TABLET, EXTENDED RELEASE ORAL DAILY
Qty: 90 TABLET | Refills: 3 | Status: SHIPPED | OUTPATIENT
Start: 2019-03-08 | End: 2019-08-14 | Stop reason: SDUPTHER

## 2019-03-08 NOTE — TELEPHONE ENCOUNTER
Cynthia desire refill of Oxybutynin CL ER 10mg tab, pt request a 90 day supply. Last annual 11/14/2018. Allergies reviewed and pharmacy verified.  There is no problem list on file for this patient.    Prior to Admission medications    Medication Sig Start Date End Date Taking? Authorizing Provider   CAREN THYROID Tab 1 TABLET BY MOUTH IN THE MORNING 9/28/17   Historical Provider, MD   ibuprofen (ADVIL,MOTRIN) 800 MG tablet Take 1 tablet (800 mg total) by mouth every 6 (six) hours as needed for Pain. 12/16/18   Chano Trammell MD   LACTOBACILLUS ACIDOPHILUS (PROBIOTIC ORAL) Take by mouth.    Historical Provider, MD   methylPREDNISolone (MEDROL DOSEPACK) 4 mg tablet Pack as directed 12/16/18   Chano Trammell MD   multivitamin (THERAGRAN) per tablet Take by mouth. 1 Tablet Oral Every day    Historical Provider, MD   oxybutynin (DITROPAN XL) 10 MG 24 hr tablet Take 1 tablet (10 mg total) by mouth once daily. 11/14/18 11/14/19  Karina Rutherford MD   progesterone (PROMETRIUM) 200 MG capsule 1 CAPSULE BY MOUTH NIGHTLY 10/6/17   Historical Provider, MD   spironolactone (ALDACTONE) 25 MG tablet Take 25 mg by mouth 2 (two) times daily. 8/23/17   Historical Provider, MD   UNABLE TO FIND 1 each by Subdermal route. medication name: Hormone pellet    Historical Provider, MD   UNABLE TO FIND 1,000 Units. medication name: DIM    Historical Provider, MD

## 2019-08-14 ENCOUNTER — OFFICE VISIT (OUTPATIENT)
Dept: OBSTETRICS AND GYNECOLOGY | Facility: CLINIC | Age: 59
End: 2019-08-14
Payer: COMMERCIAL

## 2019-08-14 ENCOUNTER — TELEPHONE (OUTPATIENT)
Dept: OBSTETRICS AND GYNECOLOGY | Facility: CLINIC | Age: 59
End: 2019-08-14

## 2019-08-14 VITALS
DIASTOLIC BLOOD PRESSURE: 78 MMHG | SYSTOLIC BLOOD PRESSURE: 120 MMHG | BODY MASS INDEX: 24.3 KG/M2 | HEIGHT: 56 IN | RESPIRATION RATE: 10 BRPM | WEIGHT: 108 LBS | HEART RATE: 88 BPM

## 2019-08-14 DIAGNOSIS — N76.0 VULVOVAGINITIS: Primary | ICD-10-CM

## 2019-08-14 DIAGNOSIS — N39.46 URGE AND STRESS INCONTINENCE: ICD-10-CM

## 2019-08-14 PROCEDURE — 87801 DETECT AGNT MULT DNA AMPLI: CPT

## 2019-08-14 PROCEDURE — 99213 OFFICE O/P EST LOW 20 MIN: CPT | Mod: S$GLB,,, | Performed by: OBSTETRICS & GYNECOLOGY

## 2019-08-14 PROCEDURE — 87661 TRICHOMONAS VAGINALIS AMPLIF: CPT

## 2019-08-14 PROCEDURE — 99999 PR PBB SHADOW E&M-EST. PATIENT-LVL III: ICD-10-PCS | Mod: PBBFAC,,, | Performed by: OBSTETRICS & GYNECOLOGY

## 2019-08-14 PROCEDURE — 87481 CANDIDA DNA AMP PROBE: CPT | Mod: 59

## 2019-08-14 PROCEDURE — 99999 PR PBB SHADOW E&M-EST. PATIENT-LVL III: CPT | Mod: PBBFAC,,, | Performed by: OBSTETRICS & GYNECOLOGY

## 2019-08-14 PROCEDURE — 99213 PR OFFICE/OUTPT VISIT, EST, LEVL III, 20-29 MIN: ICD-10-PCS | Mod: S$GLB,,, | Performed by: OBSTETRICS & GYNECOLOGY

## 2019-08-14 RX ORDER — OXYBUTYNIN CHLORIDE 10 MG/1
10 TABLET, EXTENDED RELEASE ORAL DAILY
Qty: 90 TABLET | Refills: 3 | Status: SHIPPED | OUTPATIENT
Start: 2019-08-14 | End: 2020-09-01

## 2019-08-14 NOTE — PROGRESS NOTES
Subjective:    Patient ID: Cynthia Zarco is a 59 y.o. y.o. female.     Chief Complaint:   Chief Complaint   Patient presents with    Vaginal Discharge       History of Present Illness:  Cynthia presents today complaining of ~ 2 week history of vaginal discharge with associated itching and irritation. She has tried OTC Vagisil and monistat cream with no improvement. She also reports she is having worsening urinary incontinence. She was put on Ditropan in march and reports improvement but states she ran out of refills (MAR reviewed and patient had been given 1 year supply). She also has PREETHI and we discussed different treatment options. Patient unable to do anything that requires her to miss work. Discussed trying Impressa tampons and returning for pessary placement.       ROS:   Review of Systems   Constitutional: Negative for activity change, appetite change, chills, diaphoresis, fatigue, fever and unexpected weight change.   HENT: Negative for mouth sores and tinnitus.    Eyes: Negative for discharge and visual disturbance.   Respiratory: Negative for cough, shortness of breath and wheezing.    Cardiovascular: Negative for chest pain, palpitations and leg swelling.   Gastrointestinal: Negative for abdominal pain, bloating, blood in stool, constipation, diarrhea, nausea and vomiting.   Endocrine: Negative for diabetes, hair loss, hot flashes, hyperthyroidism and hypothyroidism.   Genitourinary: Positive for vaginal discharge and urinary incontinence. Negative for dysuria, flank pain, frequency, genital sores, hematuria, urgency, vaginal bleeding, vaginal pain, postcoital bleeding and vaginal odor.   Musculoskeletal: Negative for back pain, joint swelling and myalgias.   Integumentary:  Negative for rash, acne, breast mass, nipple discharge and breast skin changes.   Neurological: Negative for seizures, syncope, numbness and headaches.   Hematological: Negative for adenopathy. Does not bruise/bleed easily.    Psychiatric/Behavioral: Negative for depression and sleep disturbance. The patient is not nervous/anxious.    Breast: Negative for mass, mastodynia, nipple discharge and skin changes          Objective:    Vital Signs:  Vitals:    08/14/19 1155   BP: 120/78   Pulse: 88   Resp: 10       Physical Exam:  General:  alert, cooperative, no distress   Head Normocephalic, without obvious abnormality, atraumatic   Neck .supple, symmetrical, trachea midline   Skin:  Skin color, texture, turgor normal. No rashes or lesions   Abdomen:  soft, non-tender; bowel sounds normal   Pelvis: External genitalia: normal general appearance  Urinary system: urethral meatus normal, bladder nontender  Vaginal: atrophic mucosa, discharge, thin white discharge  Cervix: normal appearance  Uterus: normal size, shape, position  Adnexa: non palpable   Extremities extremities normal, atraumatic, no cyanosis or edema   Neurologic Grossly normal          Assessment:      1. Vulvovaginitis    2. Urge and stress incontinence          Plan:      PLAN:   1. Affirm  2. Refill Ditropan  3. Discussed returning for pessary placement if interested

## 2019-08-14 NOTE — TELEPHONE ENCOUNTER
----- Message from Sonia Tidwell sent at 8/14/2019  8:54 AM CDT -----  Contact: self      ----- Message -----  From: Ashley Giron MA  Sent: 8/14/2019   8:41 AM  To: Krish CALDWELL Staff    Cynthia Zarco  MRN: [id number]  Home Phone      [phone]  Work Phone      Not on file.  Mobile          [phone]    No care team member to display  OB? No  What phone number can you be reached at?   [phone]  Message:   Would like to be seen today for yeast infection.

## 2019-08-15 ENCOUNTER — TELEPHONE (OUTPATIENT)
Dept: OBSTETRICS AND GYNECOLOGY | Facility: CLINIC | Age: 59
End: 2019-08-15

## 2019-08-15 LAB
BACTERIAL VAGINOSIS DNA: NEGATIVE
CANDIDA GLABRATA DNA: NEGATIVE
CANDIDA KRUSEI DNA: NEGATIVE
CANDIDA RRNA VAG QL PROBE: NEGATIVE
T VAGINALIS RRNA GENITAL QL PROBE: NEGATIVE

## 2019-08-15 NOTE — TELEPHONE ENCOUNTER
----- Message from Lisa Irby MA sent at 8/15/2019  2:35 PM CDT -----  Contact: self      ----- Message -----  From: Ashley Giron MA  Sent: 8/15/2019   2:30 PM  To: Hong Parker Staff    Cynthia Zarco  MRN: [id number]  Home Phone      [phone]  Work Phone      Not on file.  Mobile          [phone]    Patient Care Team:  Timmy Tong MD as PCP - General (Family Medicine)  OB? No  What phone number can you be reached at?   [phone]  Message:   Would like culture results.

## 2019-12-04 ENCOUNTER — TELEPHONE (OUTPATIENT)
Dept: ADMINISTRATIVE | Facility: HOSPITAL | Age: 59
End: 2019-12-04

## 2023-10-10 DIAGNOSIS — Z12.31 SCREENING MAMMOGRAM FOR BREAST CANCER: Primary | ICD-10-CM

## 2023-12-12 ENCOUNTER — OFFICE VISIT (OUTPATIENT)
Dept: OBSTETRICS AND GYNECOLOGY | Facility: CLINIC | Age: 63
End: 2023-12-12
Payer: COMMERCIAL

## 2023-12-12 ENCOUNTER — TELEPHONE (OUTPATIENT)
Dept: OBSTETRICS AND GYNECOLOGY | Facility: CLINIC | Age: 63
End: 2023-12-12

## 2023-12-12 ENCOUNTER — HOSPITAL ENCOUNTER (OUTPATIENT)
Dept: RADIOLOGY | Facility: HOSPITAL | Age: 63
Discharge: HOME | End: 2023-12-12
Attending: OBSTETRICS & GYNECOLOGY
Payer: COMMERCIAL

## 2023-12-12 VITALS — HEIGHT: 56 IN | BODY MASS INDEX: 24.3 KG/M2 | WEIGHT: 108 LBS

## 2023-12-12 VITALS
HEART RATE: 80 BPM | DIASTOLIC BLOOD PRESSURE: 76 MMHG | HEIGHT: 56 IN | WEIGHT: 116.81 LBS | BODY MASS INDEX: 26.27 KG/M2 | SYSTOLIC BLOOD PRESSURE: 118 MMHG

## 2023-12-12 DIAGNOSIS — Z12.4 SCREENING FOR CERVICAL CANCER: ICD-10-CM

## 2023-12-12 DIAGNOSIS — Z01.419 WELL WOMAN EXAM WITH ROUTINE GYNECOLOGICAL EXAM: Primary | ICD-10-CM

## 2023-12-12 DIAGNOSIS — N94.10 DYSPAREUNIA IN FEMALE: ICD-10-CM

## 2023-12-12 DIAGNOSIS — Z12.31 SCREENING MAMMOGRAM FOR BREAST CANCER: ICD-10-CM

## 2023-12-12 PROCEDURE — 77067 SCR MAMMO BI INCL CAD: CPT | Mod: TC

## 2023-12-12 PROCEDURE — 99999 PR PBB SHADOW E&M-EST. PATIENT-LVL III: ICD-10-PCS | Mod: PBBFAC,,, | Performed by: OBSTETRICS & GYNECOLOGY

## 2023-12-12 PROCEDURE — 99999 PR PBB SHADOW E&M-EST. PATIENT-LVL III: CPT | Mod: PBBFAC,,, | Performed by: OBSTETRICS & GYNECOLOGY

## 2023-12-12 PROCEDURE — 77063 BREAST TOMOSYNTHESIS BI: CPT | Mod: 26,,, | Performed by: RADIOLOGY

## 2023-12-12 PROCEDURE — 99396 PREV VISIT EST AGE 40-64: CPT | Mod: S$GLB,,, | Performed by: OBSTETRICS & GYNECOLOGY

## 2023-12-12 PROCEDURE — 88175 CYTOPATH C/V AUTO FLUID REDO: CPT | Performed by: OBSTETRICS & GYNECOLOGY

## 2023-12-12 PROCEDURE — 77067 MAMMO DIGITAL SCREENING BILAT WITH TOMO: ICD-10-PCS | Mod: 26,,, | Performed by: RADIOLOGY

## 2023-12-12 PROCEDURE — 77067 SCR MAMMO BI INCL CAD: CPT | Mod: 26,,, | Performed by: RADIOLOGY

## 2023-12-12 PROCEDURE — 77063 MAMMO DIGITAL SCREENING BILAT WITH TOMO: ICD-10-PCS | Mod: 26,,, | Performed by: RADIOLOGY

## 2023-12-12 PROCEDURE — 87624 HPV HI-RISK TYP POOLED RSLT: CPT | Performed by: OBSTETRICS & GYNECOLOGY

## 2023-12-12 PROCEDURE — 99396 PR PREVENTIVE VISIT,EST,40-64: ICD-10-PCS | Mod: S$GLB,,, | Performed by: OBSTETRICS & GYNECOLOGY

## 2023-12-12 RX ORDER — VITAMIN E 268 MG
450 CAPSULE ORAL DAILY
COMMUNITY

## 2023-12-12 RX ORDER — ACETAMINOPHEN AND PHENYLEPHRINE HCL 325; 5 MG/1; MG/1
TABLET ORAL
COMMUNITY

## 2023-12-12 RX ORDER — EVE PRIMROSE/LINOLEIC/G-LENIC 1000 MG
CAPSULE ORAL
COMMUNITY

## 2023-12-12 NOTE — TELEPHONE ENCOUNTER
Please assist in scheduling Mrs. Zarco with Dr. Tong. Pt needs to establish care with PCP and to discuss back pain. Thank you.

## 2023-12-12 NOTE — PROGRESS NOTES
Subjective:    Patient ID: Cynthia Zarco is a 63 y.o. y.o. female.     Chief Complaint: Annual Well Woman Exam     History of Present Illness:  Cynthia presents today for Annual Well Woman exam. She describes her menses as  absent, menopausal .She denies pelvic pain.  She denies breast tenderness, masses, nipple discharge. She denies GYN complaints except for severe pain with intercourse. She denies difficulty with urination or bowel movements. She admits to menopausal symptoms such as hotflashes, vaginal dryness, and night sweats.  She was on pellets but was having side effects that caused her to stop.  She denies bloating, early satiety, or weight changes. She is sexually active.    Menstrual History:   Patient's last menstrual period was 2008..     OB History    : 2  Para: 2  Term: 2            The following portions of the patient's history were reviewed and updated as appropriate: allergies, current medications, past family history, past medical history, past social history, past surgical history, and problem list.    ROS:   Review of Systems   Constitutional:  Negative for chills, diaphoresis, fatigue, fever and unexpected weight change.   HENT:  Negative for congestion, hearing loss, postnasal drip, rhinorrhea, sinus pressure, sinus pain, sore throat and tinnitus.    Eyes:  Negative for pain, discharge and visual disturbance.   Respiratory:  Negative for apnea, cough, shortness of breath and wheezing.    Cardiovascular:  Negative for chest pain, palpitations and leg swelling.   Gastrointestinal:  Negative for abdominal pain, constipation, diarrhea, nausea and vomiting.   Endocrine: Negative for cold intolerance, heat intolerance, polydipsia, polyphagia and polyuria.   Genitourinary:  Negative for difficulty urinating, dyspareunia, dysuria, enuresis, flank pain, frequency, genital sores, hematuria, menstrual problem, pelvic pain, urgency, vaginal bleeding, vaginal discharge and vaginal pain.  Patient did UA while in the office today- results reviewed by Dr. Murphy.  Spoke to patient advised- stable.  Should continue medications without change and keep appt with Dr. Murphy as scheduled.  Patient verbalizes understanding, denies further questions at this time.      Musculoskeletal:  Positive for arthralgias, back pain and myalgias. Negative for joint swelling, neck pain and neck stiffness.   Skin:  Negative for color change, pallor and rash.   Allergic/Immunologic: Negative for environmental allergies, food allergies and immunocompromised state.   Neurological:  Negative for dizziness, weakness, light-headedness, numbness and headaches.   Hematological:  Negative for adenopathy. Does not bruise/bleed easily.   Psychiatric/Behavioral:  Negative for agitation and confusion. The patient is not nervous/anxious.          Objective:    Vital Signs:  Vitals:    12/12/23 1143   BP: 118/76   Pulse: 80       Physical Exam:  General:  alert, cooperative, no distress   Skin:  Skin color, texture, turgor normal. No rashes or lesions   HEENT:  extra ocular movement intact, sclera clear, anicteric   Neck: supple, trachea midline, no adenopathy or thyromegally   Respiratory:  Normal effort   Breasts:   pendulous, no discharge, erythema, tenderness, or palpable masses; no axillary lymphadenopathy   Abdomen:  soft, nontender, no palpable masses   Pelvis: External genitalia: normal general appearance  Urinary system: urethral meatus normal, bladder nontender  Vaginal: normal without tenderness, induration or masses, atrophic mucosa  Cervix: normal appearance  Uterus: normal size, shape, position  Adnexa: normal size, nontender bilaterally   Extremities: Normal ROM; no edema, no cyanosis   Neurologial: Normal strength and tone. No focal numbness or weakness.   Psychiatric: normal mood, speech, dress, and thought processes         Assessment:       Healthy female exam.     1. Well woman exam with routine gynecological exam    2. Screening for cervical cancer    3. Dyspareunia in female          Plan:      Well woman exam with routine gynecological exam    Screening for cervical cancer  -     Liquid-Based Pap Smear, Screening  -     HPV High Risk Genotypes, PCR    Dyspareunia in female  -      prasterone, dhea, (INTRAROSA) 6.5 mg Inst; Place 6.5 mg vaginally every evening.  Dispense: 28 each; Refill: 11        MMG today negative    COUNSELING:  Cynthia was counseled on A.C.O.G. Pap guidelines and recommendations for yearly pelvic exams in addition to recommendations for monthly self breast exams; to see her PCP for other health maintenance.

## 2023-12-15 LAB
HPV HR 12 DNA SPEC QL NAA+PROBE: NEGATIVE
HPV16 AG SPEC QL: NEGATIVE
HPV18 DNA SPEC QL NAA+PROBE: NEGATIVE

## 2023-12-28 LAB
FINAL PATHOLOGIC DIAGNOSIS: NORMAL
Lab: NORMAL

## 2024-01-10 ENCOUNTER — OFFICE VISIT (OUTPATIENT)
Dept: FAMILY MEDICINE | Facility: CLINIC | Age: 64
End: 2024-01-10
Payer: COMMERCIAL

## 2024-01-10 ENCOUNTER — CLINICAL SUPPORT (OUTPATIENT)
Dept: FAMILY MEDICINE | Facility: CLINIC | Age: 64
End: 2024-01-10
Payer: COMMERCIAL

## 2024-01-10 ENCOUNTER — APPOINTMENT (OUTPATIENT)
Dept: RADIOLOGY | Facility: CLINIC | Age: 64
End: 2024-01-10
Attending: FAMILY MEDICINE
Payer: COMMERCIAL

## 2024-01-10 VITALS
SYSTOLIC BLOOD PRESSURE: 140 MMHG | HEART RATE: 86 BPM | DIASTOLIC BLOOD PRESSURE: 88 MMHG | RESPIRATION RATE: 16 BRPM | OXYGEN SATURATION: 98 % | HEIGHT: 56 IN | BODY MASS INDEX: 26.81 KG/M2 | WEIGHT: 119.19 LBS

## 2024-01-10 DIAGNOSIS — G89.29 CHRONIC BILATERAL LOW BACK PAIN WITHOUT SCIATICA: ICD-10-CM

## 2024-01-10 DIAGNOSIS — N62 LARGE BREASTS: ICD-10-CM

## 2024-01-10 DIAGNOSIS — R03.0 ELEVATED BLOOD PRESSURE READING: ICD-10-CM

## 2024-01-10 DIAGNOSIS — G89.29 CHRONIC BILATERAL LOW BACK PAIN WITHOUT SCIATICA: Primary | ICD-10-CM

## 2024-01-10 DIAGNOSIS — R60.9 EDEMA, UNSPECIFIED TYPE: ICD-10-CM

## 2024-01-10 DIAGNOSIS — M54.50 CHRONIC BILATERAL LOW BACK PAIN WITHOUT SCIATICA: Primary | ICD-10-CM

## 2024-01-10 DIAGNOSIS — Z00.00 PREVENTATIVE HEALTH CARE: ICD-10-CM

## 2024-01-10 DIAGNOSIS — Z12.11 SCREEN FOR COLON CANCER: ICD-10-CM

## 2024-01-10 DIAGNOSIS — M85.89 OTHER SPECIFIED DISORDERS OF BONE DENSITY AND STRUCTURE, MULTIPLE SITES: ICD-10-CM

## 2024-01-10 DIAGNOSIS — M54.50 CHRONIC BILATERAL LOW BACK PAIN WITHOUT SCIATICA: ICD-10-CM

## 2024-01-10 PROCEDURE — 36415 COLL VENOUS BLD VENIPUNCTURE: CPT | Mod: PBBFAC

## 2024-01-10 PROCEDURE — 36415 COLL VENOUS BLD VENIPUNCTURE: CPT | Mod: S$GLB,,, | Performed by: FAMILY MEDICINE

## 2024-01-10 PROCEDURE — 80061 LIPID PANEL: CPT | Performed by: FAMILY MEDICINE

## 2024-01-10 PROCEDURE — 72100 X-RAY EXAM L-S SPINE 2/3 VWS: CPT | Mod: 26,,, | Performed by: RADIOLOGY

## 2024-01-10 PROCEDURE — 72100 X-RAY EXAM L-S SPINE 2/3 VWS: CPT | Mod: TC,PO

## 2024-01-10 PROCEDURE — 83036 HEMOGLOBIN GLYCOSYLATED A1C: CPT | Performed by: FAMILY MEDICINE

## 2024-01-10 PROCEDURE — 82306 VITAMIN D 25 HYDROXY: CPT | Performed by: FAMILY MEDICINE

## 2024-01-10 PROCEDURE — 85025 COMPLETE CBC W/AUTO DIFF WBC: CPT | Performed by: FAMILY MEDICINE

## 2024-01-10 PROCEDURE — 99999 PR PBB SHADOW E&M-EST. PATIENT-LVL V: CPT | Mod: PBBFAC,,, | Performed by: FAMILY MEDICINE

## 2024-01-10 PROCEDURE — 80053 COMPREHEN METABOLIC PANEL: CPT | Performed by: FAMILY MEDICINE

## 2024-01-10 PROCEDURE — 84443 ASSAY THYROID STIM HORMONE: CPT | Performed by: FAMILY MEDICINE

## 2024-01-10 PROCEDURE — 99204 OFFICE O/P NEW MOD 45 MIN: CPT | Mod: S$GLB,,, | Performed by: FAMILY MEDICINE

## 2024-01-10 NOTE — PROGRESS NOTES
Subjective:       Patient ID: Cynthia Zarco is a 63 y.o. female.    Chief Complaint: Follow-up (Pt here for check up and to re-est care. )    Pt is a 63 y.o. female who presents for evaluation and management of   Encounter Diagnoses   Name Primary?    Chronic bilateral low back pain without sciatica Yes    Edema, unspecified type     Preventative health care     Other specified disorders of bone density and structure, multiple sites     Large breasts     Screen for colon cancer     Elevated blood pressure reading    .new to me     Doing well on current meds. Denies any side effects. Prevention is up to date.  Review of Systems   Constitutional:  Negative for chills and fever.   Respiratory:  Negative for shortness of breath.    Cardiovascular:  Negative for chest pain and palpitations.   Gastrointestinal:  Negative for abdominal pain, blood in stool, constipation and nausea.   Genitourinary:  Negative for difficulty urinating.   Musculoskeletal:  Positive for back pain.   Psychiatric/Behavioral:  Negative for dysphoric mood, sleep disturbance and suicidal ideas. The patient is not nervous/anxious.        Objective:      Physical Exam  Constitutional:       Appearance: She is well-developed.   HENT:      Head: Normocephalic and atraumatic.      Right Ear: External ear normal.      Left Ear: External ear normal.      Nose: Nose normal.   Eyes:      Pupils: Pupils are equal, round, and reactive to light.   Neck:      Thyroid: No thyromegaly.      Vascular: No JVD.      Trachea: No tracheal deviation.   Cardiovascular:      Rate and Rhythm: Normal rate.      Heart sounds: Normal heart sounds. No murmur heard.  Pulmonary:      Effort: Pulmonary effort is normal. No respiratory distress.      Breath sounds: Normal breath sounds. No wheezing or rales.   Chest:      Chest wall: No tenderness.   Abdominal:      General: Bowel sounds are normal. There is no distension.      Palpations: Abdomen is soft. There is no mass.       Tenderness: There is no abdominal tenderness. There is no guarding or rebound.   Musculoskeletal:         General: No tenderness. Normal range of motion.      Cervical back: Normal range of motion and neck supple.      Comments: Grooves in her upper shoulders where bra straps fall    Lymphadenopathy:      Cervical: No cervical adenopathy.   Skin:     General: Skin is warm and dry.      Coloration: Skin is not pale.      Findings: No erythema or rash.   Neurological:      Mental Status: She is alert and oriented to person, place, and time.      Cranial Nerves: No cranial nerve deficit.      Motor: No abnormal muscle tone.      Coordination: Coordination normal.      Deep Tendon Reflexes: Reflexes are normal and symmetric. Reflexes normal.   Psychiatric:         Behavior: Behavior normal.         Thought Content: Thought content normal.         Judgment: Judgment normal.         Assessment:       1. Chronic bilateral low back pain without sciatica    2. Edema, unspecified type    3. Preventative health care    4. Other specified disorders of bone density and structure, multiple sites    5. Large breasts    6. Screen for colon cancer    7. Elevated blood pressure reading        Plan:   1. Chronic bilateral low back pain without sciatica  Overview:  Mainly due to her large breast size   35 F or G.  Has grooves in her shoulders from bra straps     Orders:  -     X-Ray Lumbar Spine AP And Lateral; Future; Expected date: 01/10/2024  -     Ambulatory referral/consult to Breast Surgery; Future; Expected date: 01/17/2024    2. Edema, unspecified type  Overview:  Takes aldactone per her GYN        3. Preventative health care  -     CBC Auto Differential; Future; Expected date: 01/10/2024  -     Comprehensive Metabolic Panel; Future; Expected date: 01/10/2024  -     Hemoglobin A1C; Future; Expected date: 01/10/2024  -     Lipid Panel; Future; Expected date: 01/10/2024  -     Vitamin D; Future; Expected date: 01/10/2024  -      TSH; Future; Expected date: 01/10/2024    4. Other specified disorders of bone density and structure, multiple sites  -     DXA Bone Density Axial Skeleton 1 or more sites; Future; Expected date: 01/10/2024    5. Large breasts  -     X-Ray Lumbar Spine AP And Lateral; Future; Expected date: 01/10/2024  -     Ambulatory referral/consult to Breast Surgery; Future; Expected date: 01/17/2024    6. Screen for colon cancer  -     Cologuard Screening (Multitarget Stool DNA); Future; Expected date: 01/10/2024    7. Elevated blood pressure reading        BP log at home. No meds yet.   RTC 2 weeks     Refer to plastics for breast reduction consultation         No follow-ups on file.

## 2024-01-11 LAB
25(OH)D3+25(OH)D2 SERPL-MCNC: 90 NG/ML (ref 30–96)
ALBUMIN SERPL BCP-MCNC: 4.1 G/DL (ref 3.5–5.2)
ALP SERPL-CCNC: 45 U/L (ref 55–135)
ALT SERPL W/O P-5'-P-CCNC: 17 U/L (ref 10–44)
ANION GAP SERPL CALC-SCNC: 8 MMOL/L (ref 8–16)
AST SERPL-CCNC: 22 U/L (ref 10–40)
BASOPHILS # BLD AUTO: 0.06 K/UL (ref 0–0.2)
BASOPHILS NFR BLD: 0.8 % (ref 0–1.9)
BILIRUB SERPL-MCNC: 0.5 MG/DL (ref 0.1–1)
BUN SERPL-MCNC: 13 MG/DL (ref 8–23)
CALCIUM SERPL-MCNC: 9.7 MG/DL (ref 8.7–10.5)
CHLORIDE SERPL-SCNC: 105 MMOL/L (ref 95–110)
CHOLEST SERPL-MCNC: 280 MG/DL (ref 120–199)
CHOLEST/HDLC SERPL: 4.7 {RATIO} (ref 2–5)
CO2 SERPL-SCNC: 26 MMOL/L (ref 23–29)
CREAT SERPL-MCNC: 0.7 MG/DL (ref 0.5–1.4)
DIFFERENTIAL METHOD BLD: NORMAL
EOSINOPHIL # BLD AUTO: 0.1 K/UL (ref 0–0.5)
EOSINOPHIL NFR BLD: 0.9 % (ref 0–8)
ERYTHROCYTE [DISTWIDTH] IN BLOOD BY AUTOMATED COUNT: 14.4 % (ref 11.5–14.5)
EST. GFR  (NO RACE VARIABLE): >60 ML/MIN/1.73 M^2
ESTIMATED AVG GLUCOSE: 103 MG/DL (ref 68–131)
GLUCOSE SERPL-MCNC: 82 MG/DL (ref 70–110)
HBA1C MFR BLD: 5.2 % (ref 4–5.6)
HCT VFR BLD AUTO: 41.9 % (ref 37–48.5)
HDLC SERPL-MCNC: 60 MG/DL (ref 40–75)
HDLC SERPL: 21.4 % (ref 20–50)
HGB BLD-MCNC: 13.9 G/DL (ref 12–16)
IMM GRANULOCYTES # BLD AUTO: 0.01 K/UL (ref 0–0.04)
IMM GRANULOCYTES NFR BLD AUTO: 0.1 % (ref 0–0.5)
LDLC SERPL CALC-MCNC: 191.8 MG/DL (ref 63–159)
LYMPHOCYTES # BLD AUTO: 2.2 K/UL (ref 1–4.8)
LYMPHOCYTES NFR BLD: 27.6 % (ref 18–48)
MCH RBC QN AUTO: 30.7 PG (ref 27–31)
MCHC RBC AUTO-ENTMCNC: 33.2 G/DL (ref 32–36)
MCV RBC AUTO: 93 FL (ref 82–98)
MONOCYTES # BLD AUTO: 0.6 K/UL (ref 0.3–1)
MONOCYTES NFR BLD: 8 % (ref 4–15)
NEUTROPHILS # BLD AUTO: 4.9 K/UL (ref 1.8–7.7)
NEUTROPHILS NFR BLD: 62.6 % (ref 38–73)
NONHDLC SERPL-MCNC: 220 MG/DL
NRBC BLD-RTO: 0 /100 WBC
PLATELET # BLD AUTO: 375 K/UL (ref 150–450)
PMV BLD AUTO: 10.9 FL (ref 9.2–12.9)
POTASSIUM SERPL-SCNC: 4.7 MMOL/L (ref 3.5–5.1)
PROT SERPL-MCNC: 7.4 G/DL (ref 6–8.4)
RBC # BLD AUTO: 4.53 M/UL (ref 4–5.4)
SODIUM SERPL-SCNC: 139 MMOL/L (ref 136–145)
TRIGL SERPL-MCNC: 141 MG/DL (ref 30–150)
TSH SERPL DL<=0.005 MIU/L-ACNC: 2.96 UIU/ML (ref 0.4–4)
WBC # BLD AUTO: 7.84 K/UL (ref 3.9–12.7)

## 2024-01-12 ENCOUNTER — TELEPHONE (OUTPATIENT)
Dept: OBSTETRICS AND GYNECOLOGY | Facility: CLINIC | Age: 64
End: 2024-01-12
Payer: COMMERCIAL

## 2024-01-12 ENCOUNTER — TELEPHONE (OUTPATIENT)
Dept: FAMILY MEDICINE | Facility: CLINIC | Age: 64
End: 2024-01-12
Payer: COMMERCIAL

## 2024-01-12 DIAGNOSIS — N94.10 DYSPAREUNIA IN FEMALE: ICD-10-CM

## 2024-01-12 DIAGNOSIS — N95.2 POST-MENOPAUSAL ATROPHIC VAGINITIS: Primary | ICD-10-CM

## 2024-01-12 RX ORDER — ESTRADIOL 10 UG/1
1 INSERT VAGINAL
Qty: 8 TABLET | Refills: 11 | Status: SHIPPED | OUTPATIENT
Start: 2024-01-15 | End: 2025-01-14

## 2024-01-12 NOTE — TELEPHONE ENCOUNTER
----- Message from Judy Lazaro sent at 1/12/2024  9:15 AM CST -----  Contact: Self  Cynthia Zarco  MRN: [id number]  Home Phone      [phone]  Work Phone      Not on file.  Mobile          [phone]    Patient Care Team:  No, Primary Doctor as PCP - Karina Keith MD as Consulting Physician (Obstetrics and Gynecology)  OB? No  What phone number can you be reached at? [phone]  Message: pt wants to discuss medication

## 2024-01-12 NOTE — TELEPHONE ENCOUNTER
----- Message from Gopi Mccollum sent at 2024  9:23 AM CST -----  Contact: self  Cynthia Zarco  MRN: [id number]  : 1960  PCP: Jessie, Primary Doctor  Home Phone      [phone]  Work Phone      Not on file.  Walk-in          [phone]      MESSAGE:   Patient is asking if she needs a bone density scan, please advise      [phone]

## 2024-01-12 NOTE — TELEPHONE ENCOUNTER
Contacted and spoke to pt. States she was notified on the [employer] portal a dexa scan was due. Pt wanting to know whether test was neccessary. Advised pt Dexa scan is a preventive screening and can be completed at her convenience. Pt VU and states she will scheduled through portal.

## 2024-01-12 NOTE — TELEPHONE ENCOUNTER
Discharge Instructions    Discharged to home by medical transportation with escort. Discharged via wheelchair, hospital escort: Yes.  Special equipment needed: Not Applicable    Be sure to schedule a follow-up appointment with your primary care doctor or any specialists as instructed.     Discharge Plan:   Diet Plan: Discussed  Activity Level: Discussed  Confirmed Follow up Appointment: Patient to Call and Schedule Appointment  Confirmed Symptoms Management: Discussed  Medication Reconciliation Updated: Yes    I understand that a diet low in cholesterol, fat, and sodium is recommended for good health. Unless I have been given specific instructions below for another diet, I accept this instruction as my diet prescription.   Other diet: Low Sodium     Special Instructions: None    · Is patient discharged on Warfarin / Coumadin?   No     Depression / Suicide Risk    As you are discharged from this [facility], it is important to learn how to keep safe from harming yourself.    Recognize the warning signs:  · Abrupt changes in personality, positive or negative- including increase in energy   · Giving away possessions  · Change in eating patterns- significant weight changes-  positive or negative  · Change in sleeping patterns- unable to sleep or sleeping all the time   · Unwillingness or inability to communicate  · Depression  · Unusual sadness, discouragement and loneliness  · Talk of wanting to die  · Neglect of personal appearance   · Rebelliousness- reckless behavior  · Withdrawal from people/activities they love  · Confusion- inability to concentrate     If you or a loved one observes any of these behaviors or has concerns about self-harm, here's what you can do:  · Talk about it- your feelings and reasons for harming yourself  · Remove any means that you might use to hurt yourself (examples: pills, rope, extension cords, firearm)  · Get professional help from the community (Mental Health, Substance  Pt was called and informed and voiced understanding.   Abuse, psychological counseling)  · Do not be alone:Call your Safe Contact- someone whom you trust who will be there for you.  · Call your local CRISIS HOTLINE 020-9980 or 711-614-3606  · Call your local Children's Mobile Crisis Response Team [place] (249) 295-6359 or www.[employer]  · Call the toll free National Suicide Prevention Hotlines   · National Suicide Prevention Lifeline 284-343-SYPV (5240)  · National Hope Line Network 800-SUICIDE (259-9629)

## 2024-01-12 NOTE — TELEPHONE ENCOUNTER
Pt was called and she complained of intrarosa leaking out of vagina onto underwear, so she discontinue use. Pt desires to know if there are any other alternatives. Please advise. Thank you.

## 2024-01-13 DIAGNOSIS — E78.2 MIXED HYPERLIPIDEMIA: Primary | ICD-10-CM

## 2024-01-13 RX ORDER — ROSUVASTATIN CALCIUM 20 MG/1
20 TABLET, COATED ORAL DAILY
Qty: 30 TABLET | Refills: 5 | Status: SHIPPED | OUTPATIENT
Start: 2024-01-13 | End: 2025-01-12

## 2024-01-14 NOTE — PROGRESS NOTES
Couple of things here     --- her cholesterol is WAY too high. I am Starting crestor 20mg daily. I want her to repeat labs in 3months.     Return to clinic with appt in 3 months to review labs     --Her vit D is a little too high. How much Vit D is she taking OTC?     ---Her Thyroid numbers look good. I would stay on her [place] thyroid that she is taking. I believe it was 30mg? Is that right? Does she need a prescription for that?     Thanks

## 2024-01-16 ENCOUNTER — TELEPHONE (OUTPATIENT)
Dept: FAMILY MEDICINE | Facility: CLINIC | Age: 64
End: 2024-01-16
Payer: COMMERCIAL

## 2024-01-16 DIAGNOSIS — E03.9 HYPOTHYROIDISM, UNSPECIFIED TYPE: Primary | ICD-10-CM

## 2024-01-16 RX ORDER — THYROID 30 MG/1
30 TABLET ORAL
Qty: 30 TABLET | Refills: 5 | Status: SHIPPED | OUTPATIENT
Start: 2024-01-16 | End: 2025-01-15

## 2024-01-16 NOTE — TELEPHONE ENCOUNTER
Pn of need for cholesterol medication and need for recheck 3 mnths.  Pt states she is not on vit d/ please recheck Vit D level. Pt needs script for tyroid medication

## 2024-01-16 NOTE — TELEPHONE ENCOUNTER
----- Message from Timmy Tong MD sent at 1/13/2024  6:13 PM CST -----  Couple of things here     --- her cholesterol is WAY too high. I am Starting crestor 20mg daily. I want her to repeat labs in 3months.     Return to clinic with appt in 3 months to review labs     --Her vit D is a little too high. How much Vit D is she taking OTC?     ---Her Thyroid numbers look good. I would stay on her [place] thyroid that she is taking. I believe it was 30mg? Is that right? Does she need a prescription for that?     Thanks

## 2024-01-17 NOTE — TELEPHONE ENCOUNTER
Ok please schedule her appt in 3 months   I am sending in [place] thyroid and reordering her vit D   Thanks

## 2024-01-17 NOTE — TELEPHONE ENCOUNTER
Contacted and spoke to pt. Notified of provider sending in [place] thyroid and wanting to see her back in 3 months. Pt states before taking any thyroid medication, she will like to discuss w/pcp at her 2 week appt on 01/24.

## 2024-01-19 ENCOUNTER — HOSPITAL ENCOUNTER (OUTPATIENT)
Dept: RADIOLOGY | Facility: HOSPITAL | Age: 64
Discharge: HOME | End: 2024-01-19
Attending: FAMILY MEDICINE
Payer: COMMERCIAL

## 2024-01-19 DIAGNOSIS — M85.89 OTHER SPECIFIED DISORDERS OF BONE DENSITY AND STRUCTURE, MULTIPLE SITES: ICD-10-CM

## 2024-01-19 PROCEDURE — 77080 DXA BONE DENSITY AXIAL: CPT | Mod: 26,,, | Performed by: RADIOLOGY

## 2024-01-19 PROCEDURE — 77080 DXA BONE DENSITY AXIAL: CPT | Mod: TC

## 2024-01-21 NOTE — PROGRESS NOTES
She has osteopenia of the hip, lumbar spine is normal. OSteopenia is the beginning of decrease in bone density but is not osteoporosis yet. She needs to take VIt D 1000 IU daily and regular exercise to prevent osteoporosis

## 2024-01-24 ENCOUNTER — OFFICE VISIT (OUTPATIENT)
Dept: FAMILY MEDICINE | Facility: CLINIC | Age: 64
End: 2024-01-24
Payer: COMMERCIAL

## 2024-01-24 VITALS
DIASTOLIC BLOOD PRESSURE: 84 MMHG | RESPIRATION RATE: 16 BRPM | OXYGEN SATURATION: 97 % | HEIGHT: 56 IN | BODY MASS INDEX: 26.11 KG/M2 | WEIGHT: 116.06 LBS | SYSTOLIC BLOOD PRESSURE: 144 MMHG | HEART RATE: 98 BPM

## 2024-01-24 DIAGNOSIS — I10 PRIMARY HYPERTENSION: ICD-10-CM

## 2024-01-24 DIAGNOSIS — E78.2 MIXED HYPERLIPIDEMIA: ICD-10-CM

## 2024-01-24 DIAGNOSIS — M85.80 OSTEOPENIA, UNSPECIFIED LOCATION: Primary | ICD-10-CM

## 2024-01-24 DIAGNOSIS — N39.46 URGE AND STRESS INCONTINENCE: ICD-10-CM

## 2024-01-24 PROCEDURE — 99999 PR PBB SHADOW E&M-EST. PATIENT-LVL III: CPT | Mod: PBBFAC,,, | Performed by: FAMILY MEDICINE

## 2024-01-24 PROCEDURE — 99214 OFFICE O/P EST MOD 30 MIN: CPT | Mod: S$GLB,,, | Performed by: FAMILY MEDICINE

## 2024-01-24 RX ORDER — OXYBUTYNIN CHLORIDE 10 MG/1
10 TABLET, EXTENDED RELEASE ORAL DAILY
Qty: 30 TABLET | Refills: 5 | Status: SHIPPED | OUTPATIENT
Start: 2024-01-24 | End: 2024-04-24 | Stop reason: SDUPTHER

## 2024-01-24 RX ORDER — OLMESARTAN MEDOXOMIL 20 MG/1
20 TABLET ORAL DAILY
Qty: 30 TABLET | Refills: 5 | Status: SHIPPED | OUTPATIENT
Start: 2024-01-24 | End: 2024-04-24 | Stop reason: SDUPTHER

## 2024-01-24 NOTE — PROGRESS NOTES
Subjective:       Patient ID: Cynthia Zarco is a 63 y.o. female.    Chief Complaint: Follow-up (Pt here for 2 wk f/u. )    Pt is a 63 y.o. female who presents for evaluation and management of   Encounter Diagnoses   Name Primary?    Osteopenia, unspecified location Yes    Urge and stress incontinence     Mixed hyperlipidemia     Primary hypertension    .  Doing well on current meds. Denies any side effects. Prevention is up to date.    Review of Systems   Constitutional:  Negative for chills and fever.   Respiratory:  Negative for shortness of breath.    Cardiovascular:  Negative for chest pain and palpitations.   Gastrointestinal:  Negative for abdominal pain, blood in stool, constipation and nausea.   Genitourinary:  Negative for difficulty urinating.   Psychiatric/Behavioral:  Negative for dysphoric mood, sleep disturbance and suicidal ideas. The patient is not nervous/anxious.        Objective:      Physical Exam  Constitutional:       Appearance: She is well-developed.   HENT:      Head: Normocephalic and atraumatic.      Right Ear: External ear normal.      Left Ear: External ear normal.      Nose: Nose normal.   Eyes:      Pupils: Pupils are equal, round, and reactive to light.   Neck:      Thyroid: No thyromegaly.      Vascular: No JVD.      Trachea: No tracheal deviation.   Cardiovascular:      Rate and Rhythm: Normal rate.      Heart sounds: Normal heart sounds. No murmur heard.  Pulmonary:      Effort: Pulmonary effort is normal. No respiratory distress.      Breath sounds: Normal breath sounds. No wheezing or rales.   Chest:      Chest wall: No tenderness.   Abdominal:      General: Bowel sounds are normal. There is no distension.      Palpations: Abdomen is soft. There is no mass.      Tenderness: There is no abdominal tenderness. There is no guarding or rebound.   Musculoskeletal:         General: No tenderness. Normal range of motion.      Cervical back: Normal range of motion and neck supple.    Lymphadenopathy:      Cervical: No cervical adenopathy.   Skin:     General: Skin is warm and dry.      Coloration: Skin is not pale.      Findings: No erythema or rash.   Neurological:      Mental Status: She is alert and oriented to person, place, and time.      Cranial Nerves: No cranial nerve deficit.      Motor: No abnormal muscle tone.      Coordination: Coordination normal.      Deep Tendon Reflexes: Reflexes are normal and symmetric. Reflexes normal.   Psychiatric:         Behavior: Behavior normal.         Thought Content: Thought content normal.         Judgment: Judgment normal.         Assessment:       1. Osteopenia, unspecified location    2. Urge and stress incontinence    3. Mixed hyperlipidemia    4. Primary hypertension        Plan:   1. Osteopenia, unspecified location  Overview:  Rec Vit D 1000 IU, but her level is 90 currently. Will hold Vit D supplementation for now.... check Vit D q 6 months       2. Urge and stress incontinence  -     oxybutynin (DITROPAN-XL) 10 MG 24 hr tablet; Take 1 tablet (10 mg total) by mouth once daily.  Dispense: 30 tablet; Refill: 5    3. Mixed hyperlipidemia  Overview:  Crestor   Repeat labs in 3 months         4. Primary hypertension  -     olmesartan (BENICAR) 20 MG tablet; Take 1 tablet (20 mg total) by mouth once daily.  Dispense: 30 tablet; Refill: 5      BP check 2 weeks     RTC 3 months with labs       No follow-ups on file.

## 2024-01-25 ENCOUNTER — TELEPHONE (OUTPATIENT)
Dept: FAMILY MEDICINE | Facility: CLINIC | Age: 64
End: 2024-01-25
Payer: COMMERCIAL

## 2024-01-25 DIAGNOSIS — E03.4 HYPOTHYROIDISM DUE TO ACQUIRED ATROPHY OF THYROID: Primary | ICD-10-CM

## 2024-01-25 NOTE — TELEPHONE ENCOUNTER
Contacted pt, states thyroid, pork, (CAREN THYROID) 30 mg Tab out of pocket expense is too much for her. Requesting alternative medication if possible. Needing sent to [facility] in [place].     Please advise. Thanks

## 2024-01-25 NOTE — TELEPHONE ENCOUNTER
----- Message from Gopi Mccollum sent at 2024  9:03 AM CST -----  Contact: self  Cynthia Zarco  MRN: [id number]  : 1960  PCP: Jessie, Primary Doctor  Home Phone      [phone]  Work Phone      Not on file.  Mobile          [phone]      MESSAGE:   Patient would like the nurse to give her a call, concerning thyroid medication.        [phone]

## 2024-01-26 RX ORDER — LEVOTHYROXINE SODIUM 50 UG/1
50 TABLET ORAL
Qty: 30 TABLET | Refills: 5 | Status: SHIPPED | OUTPATIENT
Start: 2024-01-26 | End: 2025-01-25

## 2024-01-26 NOTE — TELEPHONE ENCOUNTER
Ok Im switching her to levothyroxine. There is a bit of a dose conversion when switching to this medication, so we need to repeat a TSH in 6 weeks to make sure she is on the right dose. Orders in. Cesario

## 2024-02-01 LAB — NONINV COLON CA DNA+OCC BLD SCRN STL QL: NEGATIVE

## 2024-02-07 ENCOUNTER — CLINICAL SUPPORT (OUTPATIENT)
Dept: FAMILY MEDICINE | Facility: CLINIC | Age: 64
End: 2024-02-07
Payer: COMMERCIAL

## 2024-02-07 ENCOUNTER — TELEPHONE (OUTPATIENT)
Dept: FAMILY MEDICINE | Facility: CLINIC | Age: 64
End: 2024-02-07

## 2024-02-07 VITALS — HEART RATE: 72 BPM | SYSTOLIC BLOOD PRESSURE: 130 MMHG | DIASTOLIC BLOOD PRESSURE: 68 MMHG

## 2024-02-07 DIAGNOSIS — I10 PRIMARY HYPERTENSION: Primary | ICD-10-CM

## 2024-02-07 PROCEDURE — 99999 PR PBB SHADOW E&M-EST. PATIENT-LVL I: CPT | Mod: PBBFAC,,,

## 2024-02-07 RX ORDER — SPIRONOLACTONE 25 MG/1
25 TABLET ORAL 2 TIMES DAILY
Qty: 60 TABLET | Refills: 5 | Status: SHIPPED | OUTPATIENT
Start: 2024-02-07

## 2024-02-07 NOTE — TELEPHONE ENCOUNTER
Pt came in for BP/HR check today. Pt states she is taking olmesartan (BENICAR) 20 MG tablet everyday at night. Pt states no complaints.     BP: 130/68  P: 72    Pt also requesting refill on spironolactone (ALDACTONE) 25 MG tablet, needing sent to [id number] - [place], [place] - [address]     Please advise. Thanks

## 2024-03-08 ENCOUNTER — CLINICAL SUPPORT (OUTPATIENT)
Dept: FAMILY MEDICINE | Facility: CLINIC | Age: 64
End: 2024-03-08
Payer: COMMERCIAL

## 2024-03-08 DIAGNOSIS — E03.4 HYPOTHYROIDISM DUE TO ACQUIRED ATROPHY OF THYROID: ICD-10-CM

## 2024-03-08 LAB — TSH SERPL DL<=0.005 MIU/L-ACNC: 1.74 UIU/ML (ref 0.4–4)

## 2024-03-08 PROCEDURE — 84443 ASSAY THYROID STIM HORMONE: CPT | Performed by: FAMILY MEDICINE

## 2024-03-08 PROCEDURE — 36415 COLL VENOUS BLD VENIPUNCTURE: CPT | Mod: S$GLB,,, | Performed by: FAMILY MEDICINE

## 2024-03-20 ENCOUNTER — OFFICE VISIT (OUTPATIENT)
Dept: PLASTIC SURGERY | Facility: CLINIC | Age: 64
End: 2024-03-20
Payer: COMMERCIAL

## 2024-03-20 VITALS
DIASTOLIC BLOOD PRESSURE: 69 MMHG | OXYGEN SATURATION: 99 % | SYSTOLIC BLOOD PRESSURE: 132 MMHG | BODY MASS INDEX: 26.14 KG/M2 | HEART RATE: 80 BPM | HEIGHT: 56 IN | WEIGHT: 116.19 LBS

## 2024-03-20 DIAGNOSIS — M54.2 CERVICALGIA: ICD-10-CM

## 2024-03-20 DIAGNOSIS — M25.512 CHRONIC PAIN IN LEFT SHOULDER: ICD-10-CM

## 2024-03-20 DIAGNOSIS — G89.29 CHRONIC PAIN IN RIGHT SHOULDER: ICD-10-CM

## 2024-03-20 DIAGNOSIS — M25.511 CHRONIC PAIN IN RIGHT SHOULDER: ICD-10-CM

## 2024-03-20 DIAGNOSIS — G89.29 CHRONIC PAIN IN LEFT SHOULDER: ICD-10-CM

## 2024-03-20 DIAGNOSIS — N62 HYPERTROPHY OF BREAST: Primary | ICD-10-CM

## 2024-03-20 DIAGNOSIS — L30.4 ERYTHEMA INTERTRIGO: ICD-10-CM

## 2024-03-20 PROCEDURE — 99999 PR PBB SHADOW E&M-EST. PATIENT-LVL III: CPT | Mod: PBBFAC,,, | Performed by: SURGERY

## 2024-03-20 PROCEDURE — 99204 OFFICE O/P NEW MOD 45 MIN: CPT | Mod: S$GLB,,, | Performed by: SURGERY

## 2024-03-20 NOTE — PROGRESS NOTES
"  History & Physical    SUBJECTIVE:   Chief complaint: large breasts    History of Present Illness:  Cynthia Zarco presents to [facility] 2ND FLOOR on 3/20/2024 for evaluation for bilateral breast reduction secondary to symptomatic bilateral large pendulous breast. She has a chief complaint of chronic neck and back pain for 5 years. She has tried ibuprofen, aleve, and tylenol without alleviation of pain. Pt has also used special support bras and reports multiple visits with her PCP for her back and neck pain without any relief. She also complains of deep shoulder grooving from her bra straps as well as macerating rashes below each breast that have not significantly improved with application of medicated ointments and creams. She currently reports a bra size of 34G. She is a homemaker. Former smoker, quit 27 years ago.     PMH: hypertension    Review of Systems:   HENT: Positive for neck pain.    Musculoskeletal: Positive for back pain.   Neurological: Negative for headaches or dizziness      OBJECTIVE:     /69 (BP Location: Left arm, Patient Position: Sitting, BP Method: Medium (Automatic))   Pulse 80   Ht 4' 8" (1.422 m)   Wt 52.7 kg (116 lb 2.9 oz)   LMP 08/22/2008   SpO2 99%   BMI 26.05 kg/m²       Physical Exam:  WD WN NAD  VSS  Normal resp effort  Chest: Effort normal and breath sounds normal. Bilaterally enlarged breasts, evidence of previous rashes, shoulder grooving, no palpable masses, nipple everted      Last MMG 12.12.2023  Impression:   No mammographic evidence of malignancy.  BI-RADS Category 1: Negative    ASSESSMENT/PLAN:     1.Symptomatic Bilateral Macromastia  2. Chronic neck pain  3. Chronic back pain  4. Chronic breast rashes    PLAN:Plan    -Pt was seen and evaluated by myself and Dr. Luis Daniel Peck.   -Plan for bilateral breast reduction and will need approximately 700 gm reduction per side with the intention of symptomatic relief  -Will submit paper work for " insurance approval  -Photos to be obtained  -Risk, benefits, and alternatives explained. She understands that the risks include but are not limited to bleeding, scarring, infection, pain, numbness, asymmetry, deformity, open wound, skin necrosis, wound dehiscence, permanent or temporary loss of sensation to the nipple, partial or total nipple loss requiring removal, nipple malposition, poor cosmetic outcome, hematoma, seroma and pulmonary emobolus.   - Patient would like to proceed with scheduling bilateral breast reduction pending insurance authorization  - My time with the patient includes face to face time and non-face to face time preparing to see the patient (eg, review of tests), obtaining and/or reviewing separately obtained history, documenting clinical information in the electronic or other health record, independently interpreting results and communicating results to the patient/family/caregiver, or care coordinator.    All questions were answered. The patient was advised to call the clinic with any questions or concerns prior to their next visit.     Lakeisha Hutton PA-C  Plastic and Reconstructive Surgery   [phone]

## 2024-04-19 ENCOUNTER — LAB VISIT (OUTPATIENT)
Dept: FAMILY MEDICINE | Facility: CLINIC | Age: 64
End: 2024-04-19
Payer: COMMERCIAL

## 2024-04-19 DIAGNOSIS — E03.9 HYPOTHYROIDISM, UNSPECIFIED TYPE: ICD-10-CM

## 2024-04-19 DIAGNOSIS — E78.2 MIXED HYPERLIPIDEMIA: ICD-10-CM

## 2024-04-19 LAB
25(OH)D3+25(OH)D2 SERPL-MCNC: 71 NG/ML (ref 30–96)
ALBUMIN SERPL BCP-MCNC: 4.3 G/DL (ref 3.5–5.2)
ALP SERPL-CCNC: 40 U/L (ref 55–135)
ALT SERPL W/O P-5'-P-CCNC: 15 U/L (ref 10–44)
ANION GAP SERPL CALC-SCNC: 8 MMOL/L (ref 8–16)
AST SERPL-CCNC: 18 U/L (ref 10–40)
BILIRUB SERPL-MCNC: 0.3 MG/DL (ref 0.1–1)
BUN SERPL-MCNC: 23 MG/DL (ref 8–23)
CALCIUM SERPL-MCNC: 9.9 MG/DL (ref 8.7–10.5)
CHLORIDE SERPL-SCNC: 107 MMOL/L (ref 95–110)
CHOLEST SERPL-MCNC: 179 MG/DL (ref 120–199)
CHOLEST/HDLC SERPL: 3.5 {RATIO} (ref 2–5)
CO2 SERPL-SCNC: 25 MMOL/L (ref 23–29)
CREAT SERPL-MCNC: 0.8 MG/DL (ref 0.5–1.4)
EST. GFR  (NO RACE VARIABLE): >60 ML/MIN/1.73 M^2
GLUCOSE SERPL-MCNC: 109 MG/DL (ref 70–110)
HDLC SERPL-MCNC: 51 MG/DL (ref 40–75)
HDLC SERPL: 28.5 % (ref 20–50)
LDLC SERPL CALC-MCNC: 103.6 MG/DL (ref 63–159)
NONHDLC SERPL-MCNC: 128 MG/DL
POTASSIUM SERPL-SCNC: 4.4 MMOL/L (ref 3.5–5.1)
PROT SERPL-MCNC: 7.8 G/DL (ref 6–8.4)
SODIUM SERPL-SCNC: 140 MMOL/L (ref 136–145)
TRIGL SERPL-MCNC: 122 MG/DL (ref 30–150)

## 2024-04-19 PROCEDURE — 80053 COMPREHEN METABOLIC PANEL: CPT | Performed by: FAMILY MEDICINE

## 2024-04-19 PROCEDURE — 82306 VITAMIN D 25 HYDROXY: CPT | Performed by: FAMILY MEDICINE

## 2024-04-19 PROCEDURE — 80061 LIPID PANEL: CPT | Performed by: FAMILY MEDICINE

## 2024-04-19 PROCEDURE — 36415 COLL VENOUS BLD VENIPUNCTURE: CPT | Mod: S$GLB,,, | Performed by: FAMILY MEDICINE

## 2024-04-24 ENCOUNTER — OFFICE VISIT (OUTPATIENT)
Dept: FAMILY MEDICINE | Facility: CLINIC | Age: 64
End: 2024-04-24
Payer: COMMERCIAL

## 2024-04-24 VITALS
OXYGEN SATURATION: 97 % | SYSTOLIC BLOOD PRESSURE: 128 MMHG | HEIGHT: 56 IN | HEART RATE: 75 BPM | BODY MASS INDEX: 26.08 KG/M2 | DIASTOLIC BLOOD PRESSURE: 72 MMHG | WEIGHT: 115.94 LBS

## 2024-04-24 DIAGNOSIS — N39.46 URGE AND STRESS INCONTINENCE: ICD-10-CM

## 2024-04-24 DIAGNOSIS — I10 PRIMARY HYPERTENSION: ICD-10-CM

## 2024-04-24 DIAGNOSIS — E03.9 HYPOTHYROIDISM, UNSPECIFIED TYPE: ICD-10-CM

## 2024-04-24 DIAGNOSIS — E78.2 MIXED HYPERLIPIDEMIA: ICD-10-CM

## 2024-04-24 DIAGNOSIS — E03.4 HYPOTHYROIDISM DUE TO ACQUIRED ATROPHY OF THYROID: ICD-10-CM

## 2024-04-24 PROCEDURE — 99999 PR PBB SHADOW E&M-EST. PATIENT-LVL III: CPT | Mod: PBBFAC,,, | Performed by: FAMILY MEDICINE

## 2024-04-24 PROCEDURE — 99214 OFFICE O/P EST MOD 30 MIN: CPT | Mod: S$GLB,,, | Performed by: FAMILY MEDICINE

## 2024-04-24 RX ORDER — LEVOTHYROXINE SODIUM 50 UG/1
50 TABLET ORAL
Qty: 30 TABLET | Refills: 5 | Status: CANCELLED | OUTPATIENT
Start: 2024-04-24 | End: 2025-04-24

## 2024-04-24 RX ORDER — ROSUVASTATIN CALCIUM 20 MG/1
20 TABLET, COATED ORAL DAILY
Qty: 30 TABLET | Refills: 5 | Status: SHIPPED | OUTPATIENT
Start: 2024-04-24 | End: 2025-04-24

## 2024-04-24 RX ORDER — THYROID 30 MG/1
30 TABLET ORAL
Qty: 30 TABLET | Refills: 5 | Status: CANCELLED | OUTPATIENT
Start: 2024-04-24 | End: 2025-04-24

## 2024-04-24 RX ORDER — OLMESARTAN MEDOXOMIL 20 MG/1
20 TABLET ORAL DAILY
Qty: 30 TABLET | Refills: 5 | Status: SHIPPED | OUTPATIENT
Start: 2024-04-24 | End: 2025-04-24

## 2024-04-24 RX ORDER — OXYBUTYNIN CHLORIDE 10 MG/1
10 TABLET, EXTENDED RELEASE ORAL DAILY
Qty: 30 TABLET | Refills: 5 | Status: SHIPPED | OUTPATIENT
Start: 2024-04-24

## 2024-04-24 NOTE — PROGRESS NOTES
Subjective:       Patient ID: Cynthia Zarco is a 64 y.o. female.    Chief Complaint: Follow-up (Pt here for 3 mth f/u. )    Pt is a 64 y.o. female who presents for evaluation and management of   Encounter Diagnoses   Name Primary?    Hypothyroidism due to acquired atrophy of thyroid     Primary hypertension     Urge and stress incontinence     Mixed hyperlipidemia     Hypothyroidism, unspecified type    .  Doing well on current meds. Denies any side effects. Prevention is up to date.  Review of Systems   Constitutional:  Negative for chills and fever.   Respiratory:  Negative for shortness of breath.    Cardiovascular:  Negative for chest pain and palpitations.   Gastrointestinal:  Negative for abdominal pain, blood in stool, constipation and nausea.   Genitourinary:  Negative for difficulty urinating.   Psychiatric/Behavioral:  Positive for sleep disturbance. Negative for dysphoric mood and suicidal ideas. The patient is not nervous/anxious.        Objective:      Physical Exam  Constitutional:       Appearance: She is well-developed.   HENT:      Head: Normocephalic and atraumatic.      Right Ear: External ear normal.      Left Ear: External ear normal.      Nose: Nose normal.   Eyes:      Pupils: Pupils are equal, round, and reactive to light.   Neck:      Thyroid: No thyromegaly.      Vascular: No JVD.      Trachea: No tracheal deviation.   Cardiovascular:      Rate and Rhythm: Normal rate.      Heart sounds: Normal heart sounds. No murmur heard.  Pulmonary:      Effort: Pulmonary effort is normal. No respiratory distress.      Breath sounds: Normal breath sounds. No wheezing or rales.   Chest:      Chest wall: No tenderness.   Abdominal:      General: Bowel sounds are normal. There is no distension.      Palpations: Abdomen is soft. There is no mass.      Tenderness: There is no abdominal tenderness. There is no guarding or rebound.   Musculoskeletal:         General: No tenderness. Normal range of motion.       Cervical back: Normal range of motion and neck supple.   Lymphadenopathy:      Cervical: No cervical adenopathy.   Skin:     General: Skin is warm and dry.      Coloration: Skin is not pale.      Findings: No erythema or rash.   Neurological:      Mental Status: She is alert and oriented to person, place, and time.      Cranial Nerves: No cranial nerve deficit.      Motor: No abnormal muscle tone.      Coordination: Coordination normal.      Deep Tendon Reflexes: Reflexes are normal and symmetric. Reflexes normal.   Psychiatric:         Behavior: Behavior normal.         Thought Content: Thought content normal.         Judgment: Judgment normal.         Assessment:       1. Hypothyroidism due to acquired atrophy of thyroid    2. Primary hypertension    3. Urge and stress incontinence    4. Mixed hyperlipidemia    5. Hypothyroidism, unspecified type        Plan:   1. Hypothyroidism due to acquired atrophy of thyroid  Overview:  Lab Results   Component Value Date    TSH 1.737 03/08/2024     She was on levothyroxine 50mcg, but would like to get off to see if she really needs this medication.   Will hold for 6 weeks and repeat TSH       Orders:  -     TSH; Future; Expected date: 04/24/2024    2. Primary hypertension  -     olmesartan (BENICAR) 20 MG tablet; Take 1 tablet (20 mg total) by mouth once daily.  Dispense: 30 tablet; Refill: 5    3. Urge and stress incontinence  Overview:  Oxybutinin helps some.   Does some home PT       Orders:  -     oxybutynin (DITROPAN-XL) 10 MG 24 hr tablet; Take 1 tablet (10 mg total) by mouth once daily.  Dispense: 30 tablet; Refill: 5    4. Mixed hyperlipidemia  Overview:  Crestor   Repeat labs in 3 months       Orders:  -     rosuvastatin (CRESTOR) 20 MG tablet; Take 1 tablet (20 mg total) by mouth once daily.  Dispense: 30 tablet; Refill: 5    5. Hypothyroidism, unspecified type  Overview:  Lab Results   Component Value Date    TSH 1.737 03/08/2024     She was on levothyroxine  50mcg, but would like to get off to see if she really needs this medication.   Will hold for 6 weeks and repeat TSH           No follow-ups on file.

## 2024-04-25 DIAGNOSIS — N62 HYPERTROPHY OF BREAST: Primary | ICD-10-CM

## 2024-05-15 NOTE — ANESTHESIA PAT ROS NOTE
05/15/2024  Cynthia Zarco is a 64 y.o., female.      Pre-op Assessment          Review of Systems           Anesthesia Assessment: Preoperative EQUATION    Planned Procedure: Procedure(s) (LRB):  MAMMOPLASTY, REDUCTION, BILATERAL (Bilateral)  Requested Anesthesia Type:General  Surgeon: Luis Daniel Peck MD  Service: Plastics  Known or anticipated Date of Surgery:7/9/2024    Surgeon notes: reviewed    Previous anesthesia records:Not available    Last PCP note: within 3 months , within [facility]   Subspecialty notes:  Plastics    Other important co-morbidities: HLD, HTN, and Hypothyroid      Tests already available:  Available tests,  within 3 months , within [facility] .   4/19/2024 Lipid Panel, CMP  3/8/2024 TSH    Optimization:  Anesthesia Preop Clinic Assessment  Indicated.    Medical Opinion Indicated.           Plan:    Testing:  EKG, Hemoglobin, and TSH   Pre-anesthesia  visit       Visit focus: concerns in complex and/or prolonged anesthesia     Consultation:IM Perioperative Hospitalist     Patient  has previously scheduled Medical Appointment: Not at this time prior to surgery    Navigation: Tests Scheduled.              Consults scheduled.             Results will be tracked by Preop Clinic.

## 2024-06-03 ENCOUNTER — TELEPHONE (OUTPATIENT)
Dept: PREADMISSION TESTING | Facility: HOSPITAL | Age: 64
End: 2024-06-03
Payer: COMMERCIAL

## 2024-06-03 NOTE — TELEPHONE ENCOUNTER
----- Message from Etelvina Potts RN sent at 5/15/2024  9:47 AM CDT -----  Surgery date: 7/9/2024  PreOp appt:  N/A  Surgeon: Liv    Please call Pt and schedule the following preop appts:    NP  Lab  EKG    Thank you!  Etelvina

## 2024-06-04 ENCOUNTER — CLINICAL SUPPORT (OUTPATIENT)
Dept: FAMILY MEDICINE | Facility: CLINIC | Age: 64
End: 2024-06-04
Payer: COMMERCIAL

## 2024-06-04 DIAGNOSIS — E03.4 HYPOTHYROIDISM DUE TO ACQUIRED ATROPHY OF THYROID: ICD-10-CM

## 2024-06-04 LAB — TSH SERPL DL<=0.005 MIU/L-ACNC: 2.89 UIU/ML (ref 0.4–4)

## 2024-06-04 PROCEDURE — 84443 ASSAY THYROID STIM HORMONE: CPT | Performed by: FAMILY MEDICINE

## 2024-06-04 PROCEDURE — 36415 COLL VENOUS BLD VENIPUNCTURE: CPT | Mod: S$GLB,,, | Performed by: FAMILY MEDICINE

## 2024-06-05 ENCOUNTER — TELEPHONE (OUTPATIENT)
Dept: PREADMISSION TESTING | Facility: HOSPITAL | Age: 64
End: 2024-06-05
Payer: COMMERCIAL

## 2024-07-01 ENCOUNTER — TELEPHONE (OUTPATIENT)
Dept: INTERNAL MEDICINE | Facility: CLINIC | Age: 64
End: 2024-07-01
Payer: COMMERCIAL

## 2024-07-02 ENCOUNTER — HOSPITAL ENCOUNTER (OUTPATIENT)
Dept: CARDIOLOGY | Facility: CLINIC | Age: 64
Discharge: HOME | End: 2024-07-02
Payer: COMMERCIAL

## 2024-07-02 ENCOUNTER — OFFICE VISIT (OUTPATIENT)
Dept: INTERNAL MEDICINE | Facility: CLINIC | Age: 64
End: 2024-07-02
Payer: COMMERCIAL

## 2024-07-02 ENCOUNTER — LAB VISIT (OUTPATIENT)
Dept: LAB | Facility: HOSPITAL | Age: 64
End: 2024-07-02
Attending: ANESTHESIOLOGY
Payer: COMMERCIAL

## 2024-07-02 VITALS
DIASTOLIC BLOOD PRESSURE: 63 MMHG | HEART RATE: 83 BPM | SYSTOLIC BLOOD PRESSURE: 138 MMHG | WEIGHT: 114.63 LBS | TEMPERATURE: 99 F | BODY MASS INDEX: 24.73 KG/M2 | HEIGHT: 57 IN | OXYGEN SATURATION: 99 %

## 2024-07-02 DIAGNOSIS — N62 HYPERTROPHY OF BREAST: ICD-10-CM

## 2024-07-02 DIAGNOSIS — E78.2 MIXED HYPERLIPIDEMIA: ICD-10-CM

## 2024-07-02 DIAGNOSIS — Z01.818 PREOPERATIVE TESTING: ICD-10-CM

## 2024-07-02 DIAGNOSIS — N39.46 URGE AND STRESS INCONTINENCE: ICD-10-CM

## 2024-07-02 DIAGNOSIS — G89.29 CHRONIC BILATERAL LOW BACK PAIN WITHOUT SCIATICA: ICD-10-CM

## 2024-07-02 DIAGNOSIS — E03.9 HYPOTHYROIDISM, UNSPECIFIED TYPE: ICD-10-CM

## 2024-07-02 DIAGNOSIS — M54.50 CHRONIC BILATERAL LOW BACK PAIN WITHOUT SCIATICA: ICD-10-CM

## 2024-07-02 DIAGNOSIS — Z01.818 PREOPERATIVE EXAMINATION: Primary | ICD-10-CM

## 2024-07-02 LAB
HGB BLD-MCNC: 12.6 G/DL (ref 12–16)
OHS QRS DURATION: 78 MS
OHS QTC CALCULATION: 428 MS
TSH SERPL DL<=0.005 MIU/L-ACNC: 2.13 UIU/ML (ref 0.4–4)

## 2024-07-02 PROCEDURE — 93005 ELECTROCARDIOGRAM TRACING: CPT | Mod: S$GLB,,, | Performed by: ANESTHESIOLOGY

## 2024-07-02 PROCEDURE — 85018 HEMOGLOBIN: CPT | Performed by: ANESTHESIOLOGY

## 2024-07-02 PROCEDURE — 99999 PR PBB SHADOW E&M-EST. PATIENT-LVL V: CPT | Mod: PBBFAC,,, | Performed by: NURSE PRACTITIONER

## 2024-07-02 PROCEDURE — 99215 OFFICE O/P EST HI 40 MIN: CPT | Mod: S$GLB,,, | Performed by: NURSE PRACTITIONER

## 2024-07-02 PROCEDURE — 36415 COLL VENOUS BLD VENIPUNCTURE: CPT | Performed by: ANESTHESIOLOGY

## 2024-07-02 PROCEDURE — 84443 ASSAY THYROID STIM HORMONE: CPT | Performed by: ANESTHESIOLOGY

## 2024-07-02 PROCEDURE — 93010 ELECTROCARDIOGRAM REPORT: CPT | Mod: S$GLB,,, | Performed by: INTERNAL MEDICINE

## 2024-07-02 NOTE — ASSESSMENT & PLAN NOTE
Currently not taking Levothyroxine- okay to stay off per PCP since TSH level is normal 6/4/24  Hx of nodules.   Followed by PCP.

## 2024-07-02 NOTE — OUTPATIENT SUBJECTIVE & OBJECTIVE
Outpatient Subjective & Objective      Chief Complaint: Preoperative evaulation, perioperative medical management, and complication reduction plan.     Functional Capacity: does yardwork- denies CP/SOB.       Anesthesia issues: None    Difficulty mouth opening: No    Steroid use in the last 12 months:  No    Dental Issues: None    Family anesthesia difficulty: None     Family Hx of Thrombosis: None    Past Medical History:   Diagnosis Date    Abnormal Pap smear 09/2009    ASCUS    Hyperlipidemia     Hypertension          Past Medical History Pertinent Negatives:   Diagnosis Date Noted    Abnormal Pap smear of cervix 11/14/2018    Anxiety 07/02/2024    Asthma 07/02/2024    CHF (congestive heart failure) 07/02/2024    COPD (chronic obstructive pulmonary disease) 07/02/2024    Coronary artery disease 07/02/2024    Deep vein thrombosis 07/02/2024    Depression 07/02/2024    Diabetes mellitus, type 2 07/02/2024    Disorder of kidney and ureter 07/02/2024    GERD (gastroesophageal reflux disease) 07/02/2024    Myocardial infarction 07/02/2024    Pulmonary embolism 07/02/2024    Seizures 07/02/2024    Stroke 07/02/2024         Past Surgical History:   Procedure Laterality Date    COLONOSCOPY      tendonitis surgery bilaterally Bilateral     TUBAL LIGATION         Review of Systems   Constitutional:  Negative for chills, fatigue, fever and unexpected weight change.   HENT:  Positive for postnasal drip (occasional-). Negative for congestion, hearing loss, rhinorrhea, sore throat, tinnitus and trouble swallowing.    Eyes:  Negative for visual disturbance.   Respiratory:  Negative for cough, chest tightness, shortness of breath and wheezing.         STOP BANG risk factors:  Snoring  Daytime sleepiness/fatigue  Observed stop breathing  HTN  BMI > 35  Large neck size  Male sex   Cardiovascular:  Negative for chest pain, palpitations and leg swelling.   Gastrointestinal:  Positive for anal bleeding (COccasional- recent Cologuard  "negative; attributes to hemorrhoids) and constipation. Negative for diarrhea.        Denies Fatty liver, Hepatitis   Genitourinary:  Negative for decreased urine volume, difficulty urinating, dysuria, frequency, hematuria and urgency.        Incontinence   Musculoskeletal:  Positive for arthralgias (bilateral foot) and back pain. Negative for neck pain and neck stiffness.   Skin:  Negative for rash and wound.   Neurological:  Negative for dizziness, syncope, weakness, numbness and headaches.   Hematological:  Does not bruise/bleed easily.   Psychiatric/Behavioral:  Negative for sleep disturbance and suicidal ideas.               VITALS  Visit Vitals  BP (!) 144/70 (BP Location: Left arm, Patient Position: Sitting)   Pulse 89   Temp 99 °F (37.2 °C) (Oral)   Ht 4' 9" (1.448 m)   Wt 52 kg (114 lb 10.2 oz)   LMP 08/22/2008   SpO2 99%   BMI 24.81 kg/m²          Physical Exam  Vitals reviewed.   Constitutional:       General: She is not in acute distress.     Appearance: She is well-developed.   HENT:      Head: Normocephalic.      Nose: Nose normal.      Mouth/Throat:      Pharynx: No oropharyngeal exudate.   Eyes:      General:         Right eye: No discharge.         Left eye: No discharge.      Conjunctiva/sclera: Conjunctivae normal.      Pupils: Pupils are equal, round, and reactive to light.   Neck:      Thyroid: No thyromegaly.      Vascular: No carotid bruit or JVD.      Trachea: No tracheal deviation.   Cardiovascular:      Rate and Rhythm: Normal rate and regular rhythm.      Pulses:           Carotid pulses are 2+ on the right side and 2+ on the left side.       Dorsalis pedis pulses are 2+ on the right side and 2+ on the left side.        Posterior tibial pulses are 2+ on the right side and 2+ on the left side.      Heart sounds: Normal heart sounds. No murmur heard.  Pulmonary:      Effort: Pulmonary effort is normal. No respiratory distress.      Breath sounds: Normal breath sounds. No stridor. No wheezing, " rhonchi or rales.   Abdominal:      General: Bowel sounds are normal. There is no distension.      Palpations: Abdomen is soft.      Tenderness: There is no abdominal tenderness. There is no guarding.   Musculoskeletal:      Cervical back: Normal range of motion. No pain with movement.      Right lower leg: No edema.      Left lower leg: No edema.   Lymphadenopathy:      Cervical: No cervical adenopathy.   Skin:     General: Skin is warm and dry.      Capillary Refill: Capillary refill takes less than 2 seconds.      Findings: No erythema or rash.   Neurological:      Mental Status: She is alert and oriented to person, place, and time.   Psychiatric:         Behavior: Behavior normal.          Significant Labs:  Lab Results   Component Value Date    WBC 7.84 01/10/2024    HGB 12.6 07/02/2024    HCT 41.9 01/10/2024     01/10/2024    CHOL 179 04/19/2024    TRIG 122 04/19/2024    HDL 51 04/19/2024    ALT 15 04/19/2024    AST 18 04/19/2024     04/19/2024    K 4.4 04/19/2024     04/19/2024    CREATININE 0.8 04/19/2024    BUN 23 04/19/2024    CO2 25 04/19/2024    TSH 2.131 07/02/2024    HGBA1C 5.2 01/10/2024           EKG:   Results for orders placed or performed during the hospital encounter of 07/02/24   EKG 12-lead    Collection Time: 07/02/24 12:06 PM   Result Value Ref Range    QRS Duration 78 ms    [facility] QTC Calculation 428 ms    Narrative    Test Reason : Z01.818,    Vent. Rate : 074 BPM     Atrial Rate : 074 BPM     P-R Int : 124 ms          QRS Dur : 078 ms      QT Int : 386 ms       P-R-T Axes : 056 007 020 degrees     QTc Int : 428 ms    Normal sinus rhythm  Normal ECG  No previous ECGs available  Confirmed by Lina Paz MD ([id number]) on 7/2/2024 3:24:45 PM    Referred By: JUNIE ROPER           Confirmed By:Lina Paz MD                 Active Cardiac Conditions: None      Revised Cardiac Risk Index   High -Risk Surgery  Intraperitoneal; Intrathoracic; suprainguinal vascular Yes- + 1 No- 0    History of Ischemic Heart Disease   (Hx of MI/positive exercise test/current chest pain due to ischemia/use of nitrate therapy/EKG with pathological Q waves) Yes- + 1 No- 0   History of CHF  (Pulmonary edema/bilateral rales or S3 gallop/PND/CXR showing pulmonary vascular redistribution) Yes- + 1 No- 0   History of CVA   (Prior stroke or TIA) Yes- + 1 No- 0   Pre-operative treatment with insulin Yes- + 1 No- 0   Pre-operative creatinine > 2mg/dl Yes- + 1 No- 0   Total: 0      Risk Status:  Estimated risk of cardiac complications after non-cardiac surgery using the Revised Cardiac Risk Index for Preoperative risk is 3.9 %      ARISCAT (Canet) risk index: Intermediate: 13.3% risk of post-op pulmonary complications.    American Society of Anesthesiologists Physical Status classification (ASA): 2             Outpatient Subjective & Objective

## 2024-07-02 NOTE — ASSESSMENT & PLAN NOTE
Current pain   Treating with Tylenol Arthritis/Ibuprofen/Aleve  No loss of bladder or bowel control    Denies N/T to buttocks, perineum and inner surfaces of the thighs (saddle anesthesia)        Recent imaging: Xray L-Spine 1/10/24  FINDINGS:  There is slight S shaped thoracolumbar scoliosis convex to the left in the lumbar spine.  The vertebral bodies are intact without evidence of fracture or compression.  There is disc space narrowing at every level of the lumbar spine from L1-S1.  Spondylolisthesis is not seen.     Impression:     Degenerative disc disease at every level from L1-S1.  Slight thoracolumbar scoliosis

## 2024-07-02 NOTE — ASSESSMENT & PLAN NOTE
Recommend , healthy diet (DASH/Mediterranean) and exercise.   Patient should exercise 30 minutes at least five times weekly once recovered from surgery. Limit alcohol.  Treated with: Crestor    Component      Latest Ref Rng 4/19/2024   Cholesterol Total      120 - 199 mg/dL 179    Triglycerides      30 - 150 mg/dL 122    HDL      40 - 75 mg/dL 51    LDL Cholesterol      63.0 - 159.0 mg/dL 103.6    HDL/Cholesterol Ratio      20.0 - 50.0 % 28.5    Total Cholesterol/HDL Ratio      2.0 - 5.0  3.5    Non-HDL Cholesterol      mg/dL 128

## 2024-07-02 NOTE — HPI
This is a 64 y.o. female  who presents today with  for a preoperative evaluation in preparation for bilateral mammoplasty.   Surgery is indicated for  hypertrophy of breasts.   Patient is new to me.  The history has been obtained by speaking with the patient and reviewing the electronic medical record and/or outside health information. Significant health conditions for the perioperative period are discussed below in assessment and plan.   Patient reports current health status to be Good.  Denies any new symptoms before surgery.

## 2024-07-02 NOTE — PROGRESS NOTES
[facility]  Progress Note    Patient Name: Cynthia Zarco  MRN: [id number]  Date of Evaluation- 07/02/2024  PCP- Timmy Tong MD    Future cases for Jerilyn Zarco [[id number]]       Case ID Status Date Time Brandon Procedure Provider Location    [id number] [facility] 7/9/2024  7:30  MAMMOPLASTY, REDUCTION, BILATERAL Luis Daniel Peck MD [[id number]] [facility] OR 2ND FLR            HPI:  This is a 64 y.o. female  who presents today with  for a preoperative evaluation in preparation for bilateral mammoplasty.   Surgery is indicated for  hypertrophy of breasts.   Patient is new to me.  The history has been obtained by speaking with the patient and reviewing the electronic medical record and/or outside health information. Significant health conditions for the perioperative period are discussed below in assessment and plan.   Patient reports current health status to be Good.  Denies any new symptoms before surgery.       Subjective/ Objective:     Chief Complaint: Preoperative evaulation, perioperative medical management, and complication reduction plan.     Functional Capacity: does yardwork- denies CP/SOB.       Anesthesia issues: None    Difficulty mouth opening: No    Steroid use in the last 12 months:  No    Dental Issues: None    Family anesthesia difficulty: None     Family Hx of Thrombosis: None    Past Medical History:   Diagnosis Date    Abnormal Pap smear 09/2009    ASCUS    Hyperlipidemia     Hypertension          Past Medical History Pertinent Negatives:   Diagnosis Date Noted    Abnormal Pap smear of cervix 11/14/2018    Anxiety 07/02/2024    Asthma 07/02/2024    CHF (congestive heart failure) 07/02/2024    COPD (chronic obstructive pulmonary disease) 07/02/2024    Coronary artery disease 07/02/2024    Deep vein thrombosis 07/02/2024    Depression 07/02/2024    Diabetes mellitus, type 2 07/02/2024    Disorder of kidney and ureter 07/02/2024    GERD (gastroesophageal reflux disease)  "07/02/2024    Myocardial infarction 07/02/2024    Pulmonary embolism 07/02/2024    Seizures 07/02/2024    Stroke 07/02/2024         Past Surgical History:   Procedure Laterality Date    COLONOSCOPY      tendonitis surgery bilaterally Bilateral     TUBAL LIGATION         Review of Systems   Constitutional:  Negative for chills, fatigue, fever and unexpected weight change.   HENT:  Positive for postnasal drip (occasional-). Negative for congestion, hearing loss, rhinorrhea, sore throat, tinnitus and trouble swallowing.    Eyes:  Negative for visual disturbance.   Respiratory:  Negative for cough, chest tightness, shortness of breath and wheezing.         STOP BANG risk factors:  Snoring  Daytime sleepiness/fatigue  Observed stop breathing  HTN  BMI > 35  Large neck size  Male sex   Cardiovascular:  Negative for chest pain, palpitations and leg swelling.   Gastrointestinal:  Positive for anal bleeding (COccasional- recent Cologuard negative; attributes to hemorrhoids) and constipation. Negative for diarrhea.        Denies Fatty liver, Hepatitis   Genitourinary:  Negative for decreased urine volume, difficulty urinating, dysuria, frequency, hematuria and urgency.        Incontinence   Musculoskeletal:  Positive for arthralgias (bilateral foot) and back pain. Negative for neck pain and neck stiffness.   Skin:  Negative for rash and wound.   Neurological:  Negative for dizziness, syncope, weakness, numbness and headaches.   Hematological:  Does not bruise/bleed easily.   Psychiatric/Behavioral:  Negative for sleep disturbance and suicidal ideas.               VITALS  Visit Vitals  BP (!) 144/70 (BP Location: Left arm, Patient Position: Sitting)   Pulse 89   Temp 99 °F (37.2 °C) (Oral)   Ht 4' 9" (1.448 m)   Wt 52 kg (114 lb 10.2 oz)   LMP 08/22/2008   SpO2 99%   BMI 24.81 kg/m²          Physical Exam  Vitals reviewed.   Constitutional:       General: She is not in acute distress.     Appearance: She is well-developed. "   HENT:      Head: Normocephalic.      Nose: Nose normal.      Mouth/Throat:      Pharynx: No oropharyngeal exudate.   Eyes:      General:         Right eye: No discharge.         Left eye: No discharge.      Conjunctiva/sclera: Conjunctivae normal.      Pupils: Pupils are equal, round, and reactive to light.   Neck:      Thyroid: No thyromegaly.      Vascular: No carotid bruit or JVD.      Trachea: No tracheal deviation.   Cardiovascular:      Rate and Rhythm: Normal rate and regular rhythm.      Pulses:           Carotid pulses are 2+ on the right side and 2+ on the left side.       Dorsalis pedis pulses are 2+ on the right side and 2+ on the left side.        Posterior tibial pulses are 2+ on the right side and 2+ on the left side.      Heart sounds: Normal heart sounds. No murmur heard.  Pulmonary:      Effort: Pulmonary effort is normal. No respiratory distress.      Breath sounds: Normal breath sounds. No stridor. No wheezing, rhonchi or rales.   Abdominal:      General: Bowel sounds are normal. There is no distension.      Palpations: Abdomen is soft.      Tenderness: There is no abdominal tenderness. There is no guarding.   Musculoskeletal:      Cervical back: Normal range of motion. No pain with movement.      Right lower leg: No edema.      Left lower leg: No edema.   Lymphadenopathy:      Cervical: No cervical adenopathy.   Skin:     General: Skin is warm and dry.      Capillary Refill: Capillary refill takes less than 2 seconds.      Findings: No erythema or rash.   Neurological:      Mental Status: She is alert and oriented to person, place, and time.   Psychiatric:         Behavior: Behavior normal.          Significant Labs:  Lab Results   Component Value Date    WBC 7.84 01/10/2024    HGB 12.6 07/02/2024    HCT 41.9 01/10/2024     01/10/2024    CHOL 179 04/19/2024    TRIG 122 04/19/2024    HDL 51 04/19/2024    ALT 15 04/19/2024    AST 18 04/19/2024     04/19/2024    K 4.4 04/19/2024      04/19/2024    CREATININE 0.8 04/19/2024    BUN 23 04/19/2024    CO2 25 04/19/2024    TSH 2.131 07/02/2024    HGBA1C 5.2 01/10/2024           EKG:   Results for orders placed or performed during the hospital encounter of 07/02/24   EKG 12-lead    Collection Time: 07/02/24 12:06 PM   Result Value Ref Range    QRS Duration 78 ms    OHS QTC Calculation 428 ms    Narrative    Test Reason : Z01.818,    Vent. Rate : 074 BPM     Atrial Rate : 074 BPM     P-R Int : 124 ms          QRS Dur : 078 ms      QT Int : 386 ms       P-R-T Axes : 056 007 020 degrees     QTc Int : 428 ms    Normal sinus rhythm  Normal ECG  No previous ECGs available  Confirmed by Lina Paz MD ([id number]) on 7/2/2024 3:24:45 PM    Referred By: JUNIE ROPER           Confirmed By:Lina Paz MD                 Active Cardiac Conditions: None      Revised Cardiac Risk Index   High -Risk Surgery  Intraperitoneal; Intrathoracic; suprainguinal vascular Yes- + 1 No- 0   History of Ischemic Heart Disease   (Hx of MI/positive exercise test/current chest pain due to ischemia/use of nitrate therapy/EKG with pathological Q waves) Yes- + 1 No- 0   History of CHF  (Pulmonary edema/bilateral rales or S3 gallop/PND/CXR showing pulmonary vascular redistribution) Yes- + 1 No- 0   History of CVA   (Prior stroke or TIA) Yes- + 1 No- 0   Pre-operative treatment with insulin Yes- + 1 No- 0   Pre-operative creatinine > 2mg/dl Yes- + 1 No- 0   Total: 0      Risk Status:  Estimated risk of cardiac complications after non-cardiac surgery using the Revised Cardiac Risk Index for Preoperative risk is 3.9 %      ARISCAT (Canet) risk index: Intermediate: 13.3% risk of post-op pulmonary complications.    American Society of Anesthesiologists Physical Status classification (ASA): 2                            Assessment/Plan:     Hypertrophy of breast  Scheduled with Dr. Peck on 7/9/24 for bilateral mammoplasty.     Mixed hyperlipidemia  Recommend , healthy diet  (DASH/Mediterranean) and exercise.   Patient should exercise 30 minutes at least five times weekly once recovered from surgery. Limit alcohol.  Treated with: Crestor    Component      Latest Ref Rng 4/19/2024   Cholesterol Total      120 - 199 mg/dL 179    Triglycerides      30 - 150 mg/dL 122    HDL      40 - 75 mg/dL 51    LDL Cholesterol      63.0 - 159.0 mg/dL 103.6    HDL/Cholesterol Ratio      20.0 - 50.0 % 28.5    Total Cholesterol/HDL Ratio      2.0 - 5.0  3.5    Non-HDL Cholesterol      mg/dL 128          Urge and stress incontinence  Treated with oxybutynin; not well controlled.  Wears pads; will follow-up with PCP      Hypothyroid  Currently not taking Levothyroxine- okay to stay off per PCP since TSH level is normal 6/4/24  Hx of nodules.   Followed by PCP.    Chronic bilateral low back pain without sciatica  Current pain   Treating with Tylenol Arthritis/Ibuprofen/Aleve  No loss of bladder or bowel control    Denies N/T to buttocks, perineum and inner surfaces of the thighs (saddle anesthesia)        Recent imaging: Xray L-Spine 1/10/24  FINDINGS:  There is slight S shaped thoracolumbar scoliosis convex to the left in the lumbar spine.  The vertebral bodies are intact without evidence of fracture or compression.  There is disc space narrowing at every level of the lumbar spine from L1-S1.  Spondylolisthesis is not seen.     Impression:     Degenerative disc disease at every level from L1-S1.  Slight thoracolumbar scoliosis           Discussion/Management of Perioperative Care    Thromboembolic prophylaxis (VTE) Care: Risk factors for thrombosis include: surgical procedure.  I recommend prophylaxis of thromboembolism with the use of compression stockings/pneumatic devices, and/or pharmacologic agents. The benefits should outweigh the risks for pharmacologic prophylaxis in the perioperative period. I also encourage early ambulation if not contraindicated during the post-operative period.    Risk factors  for post-operative pulmonary complications include:surgery > 3 hours. To reduce the risk of pulmonary complications, prophylactic recommendations include: incentive spirometry use/deep breathing, end tidal carbon dioxide monitoring, and pain control.    To reduce the risk of postoperative renal complications, I recommend the patient maintain adequate hydration.  Avoid/reduce NSAIDS and LEON-2 inhibitors use as well as IV contrast for renal protection.    I recommend the use of appropriate prophylactic antibiotics to reduce the risk of surgical site infections.    I recommend to avoid/decrease the use of benzodiazepines, anticholinergics, and Benadryl in the perioperative period. I also recommend using opioids for the shortest period of time if possible.          This visit was focused on Preoperative evaluation, Perioperative Medical management, complication reduction plans. I suggest that the patient follows up with primary care or relevant sub specialists for ongoing health care.    I appreciate the opportunity to be involved in this patients care. Please feel free to contact me if there were any questions about this consultation.        I spent a total of 45 minutes on the day of the visit.This includes face to face time and non-face to face time preparing to see the patient (e.g., review of tests), obtaining and/or reviewing separately obtained history, documenting clinical information in the electronic or other health record, independently interpreting results and communicating results to the patient/family/caregiver, or care coordinator.         Patient is optimized for surgery.     7/3/24: Received 2 home BP readings: 134/71 and 123/70- acceptable for surgery.       Merle Ritter NP  Perioperative Medicine  [facility]

## 2024-07-08 ENCOUNTER — TELEPHONE (OUTPATIENT)
Dept: PLASTIC SURGERY | Facility: CLINIC | Age: 64
End: 2024-07-08
Payer: COMMERCIAL

## 2024-07-08 ENCOUNTER — ANESTHESIA EVENT (OUTPATIENT)
Dept: SURGERY | Facility: HOSPITAL | Age: 64
End: 2024-07-08
Payer: COMMERCIAL

## 2024-07-08 NOTE — TELEPHONE ENCOUNTER
Contacted pt to discuss surgery arrival time for tomorrow.  Pt advised to arrive on the 2nd floor, [facility] for 8:30a. Explained to patient she should not eat or drink anything after midnight.  Also asked that she be sure to take a complete shower/bath with Dial soap prior to arriving.  Pt verbalized understanding.

## 2024-07-08 NOTE — TELEPHONE ENCOUNTER
Contacted pt with updated arrival time. Pt advised to arrive for 10:30a.  Pt verbalized understanding.

## 2024-07-09 ENCOUNTER — ANESTHESIA (OUTPATIENT)
Dept: SURGERY | Facility: HOSPITAL | Age: 64
End: 2024-07-09
Payer: COMMERCIAL

## 2024-07-09 ENCOUNTER — HOSPITAL ENCOUNTER (OUTPATIENT)
Facility: HOSPITAL | Age: 64
Discharge: HOME | End: 2024-07-09
Attending: SURGERY | Admitting: SURGERY
Payer: COMMERCIAL

## 2024-07-09 VITALS
OXYGEN SATURATION: 100 % | HEIGHT: 56 IN | DIASTOLIC BLOOD PRESSURE: 56 MMHG | BODY MASS INDEX: 26.14 KG/M2 | TEMPERATURE: 98 F | HEART RATE: 81 BPM | WEIGHT: 116.19 LBS | RESPIRATION RATE: 19 BRPM | SYSTOLIC BLOOD PRESSURE: 114 MMHG

## 2024-07-09 DIAGNOSIS — N62 MACROMASTIA: ICD-10-CM

## 2024-07-09 DIAGNOSIS — Z01.818 PREOPERATIVE TESTING: Primary | ICD-10-CM

## 2024-07-09 DIAGNOSIS — E03.9 HYPOTHYROIDISM, UNSPECIFIED TYPE: ICD-10-CM

## 2024-07-09 DIAGNOSIS — N62 HYPERTROPHY OF BREAST: ICD-10-CM

## 2024-07-09 PROCEDURE — 71000015 HC POSTOP RECOV 1ST HR: Performed by: SURGERY

## 2024-07-09 PROCEDURE — 27201423 OPTIME MED/SURG SUP & DEVICES STERILE SUPPLY: Performed by: SURGERY

## 2024-07-09 PROCEDURE — 25000003 PHARM REV CODE 250: Performed by: ANESTHESIOLOGY

## 2024-07-09 PROCEDURE — 71000044 HC DOSC ROUTINE RECOVERY FIRST HOUR: Performed by: SURGERY

## 2024-07-09 PROCEDURE — 37000008 HC ANESTHESIA 1ST 15 MINUTES: Performed by: SURGERY

## 2024-07-09 PROCEDURE — 37000009 HC ANESTHESIA EA ADD 15 MINS: Performed by: SURGERY

## 2024-07-09 PROCEDURE — 36000707: Performed by: SURGERY

## 2024-07-09 PROCEDURE — 63600175 PHARM REV CODE 636 W HCPCS: Mod: JZ,JG | Performed by: ANESTHESIOLOGY

## 2024-07-09 PROCEDURE — 63600175 PHARM REV CODE 636 W HCPCS

## 2024-07-09 PROCEDURE — 88342 IMHCHEM/IMCYTCHM 1ST ANTB: CPT | Performed by: PATHOLOGY

## 2024-07-09 PROCEDURE — C1729 CATH, DRAINAGE: HCPCS | Performed by: SURGERY

## 2024-07-09 PROCEDURE — 25000003 PHARM REV CODE 250: Performed by: NURSE ANESTHETIST, CERTIFIED REGISTERED

## 2024-07-09 PROCEDURE — 25000003 PHARM REV CODE 250

## 2024-07-09 PROCEDURE — 76942 ECHO GUIDE FOR BIOPSY: CPT

## 2024-07-09 PROCEDURE — 19318 BREAST REDUCTION: CPT | Mod: 50,,, | Performed by: SURGERY

## 2024-07-09 PROCEDURE — 63600175 PHARM REV CODE 636 W HCPCS: Performed by: NURSE ANESTHETIST, CERTIFIED REGISTERED

## 2024-07-09 PROCEDURE — 36000706: Performed by: SURGERY

## 2024-07-09 PROCEDURE — 88341 IMHCHEM/IMCYTCHM EA ADD ANTB: CPT | Performed by: PATHOLOGY

## 2024-07-09 PROCEDURE — 63600175 PHARM REV CODE 636 W HCPCS: Performed by: ANESTHESIOLOGY

## 2024-07-09 PROCEDURE — 25000003 PHARM REV CODE 250: Performed by: SURGERY

## 2024-07-09 PROCEDURE — 88305 TISSUE EXAM BY PATHOLOGIST: CPT | Mod: 59 | Performed by: PATHOLOGY

## 2024-07-09 PROCEDURE — 64461 PVB THORACIC SINGLE INJ SITE: CPT

## 2024-07-09 PROCEDURE — 25000003 PHARM REV CODE 250: Performed by: STUDENT IN AN ORGANIZED HEALTH CARE EDUCATION/TRAINING PROGRAM

## 2024-07-09 RX ORDER — SODIUM CHLORIDE 0.9 % (FLUSH) 0.9 %
10 SYRINGE (ML) INJECTION
Status: DISCONTINUED | OUTPATIENT
Start: 2024-07-09 | End: 2024-07-09 | Stop reason: HOSPADM

## 2024-07-09 RX ORDER — LIDOCAINE HYDROCHLORIDE 20 MG/ML
INJECTION, SOLUTION EPIDURAL; INFILTRATION; INTRACAUDAL; PERINEURAL
Status: DISCONTINUED | OUTPATIENT
Start: 2024-07-09 | End: 2024-07-09

## 2024-07-09 RX ORDER — CELECOXIB 200 MG/1
400 CAPSULE ORAL
Status: COMPLETED | OUTPATIENT
Start: 2024-07-09 | End: 2024-07-09

## 2024-07-09 RX ORDER — MUPIROCIN 20 MG/G
OINTMENT TOPICAL
Status: DISCONTINUED | OUTPATIENT
Start: 2024-07-09 | End: 2024-07-09 | Stop reason: HOSPADM

## 2024-07-09 RX ORDER — SCOLOPAMINE TRANSDERMAL SYSTEM 1 MG/1
1 PATCH, EXTENDED RELEASE TRANSDERMAL
Status: DISCONTINUED | OUTPATIENT
Start: 2024-07-09 | End: 2024-07-09 | Stop reason: HOSPADM

## 2024-07-09 RX ORDER — LIDOCAINE HYDROCHLORIDE 10 MG/ML
1 INJECTION, SOLUTION EPIDURAL; INFILTRATION; INTRACAUDAL; PERINEURAL ONCE
Status: DISCONTINUED | OUTPATIENT
Start: 2024-07-09 | End: 2024-07-09 | Stop reason: HOSPADM

## 2024-07-09 RX ORDER — FENTANYL CITRATE 50 UG/ML
25-200 INJECTION, SOLUTION INTRAMUSCULAR; INTRAVENOUS
Status: DISCONTINUED | OUTPATIENT
Start: 2024-07-09 | End: 2024-07-09 | Stop reason: HOSPADM

## 2024-07-09 RX ORDER — ROCURONIUM BROMIDE 10 MG/ML
INJECTION, SOLUTION INTRAVENOUS
Status: DISCONTINUED | OUTPATIENT
Start: 2024-07-09 | End: 2024-07-09

## 2024-07-09 RX ORDER — ONDANSETRON HYDROCHLORIDE 2 MG/ML
INJECTION, SOLUTION INTRAVENOUS
Status: DISCONTINUED | OUTPATIENT
Start: 2024-07-09 | End: 2024-07-09

## 2024-07-09 RX ORDER — ACETAMINOPHEN 500 MG
1000 TABLET ORAL
Status: COMPLETED | OUTPATIENT
Start: 2024-07-09 | End: 2024-07-09

## 2024-07-09 RX ORDER — BUPIVACAINE HYDROCHLORIDE 7.5 MG/ML
INJECTION, SOLUTION EPIDURAL; RETROBULBAR
Status: COMPLETED | OUTPATIENT
Start: 2024-07-09 | End: 2024-07-09

## 2024-07-09 RX ORDER — DEXAMETHASONE SODIUM PHOSPHATE 4 MG/ML
INJECTION, SOLUTION INTRA-ARTICULAR; INTRALESIONAL; INTRAMUSCULAR; INTRAVENOUS; SOFT TISSUE
Status: DISCONTINUED | OUTPATIENT
Start: 2024-07-09 | End: 2024-07-09

## 2024-07-09 RX ORDER — BACITRACIN 500 [USP'U]/G
OINTMENT TOPICAL 3 TIMES DAILY
Status: DISCONTINUED | OUTPATIENT
Start: 2024-07-09 | End: 2024-07-09 | Stop reason: HOSPADM

## 2024-07-09 RX ORDER — CEPHALEXIN 500 MG/1
500 CAPSULE ORAL EVERY 8 HOURS
Qty: 21 CAPSULE | Refills: 0 | Status: SHIPPED | OUTPATIENT
Start: 2024-07-09

## 2024-07-09 RX ORDER — CEFAZOLIN SODIUM 1 G/3ML
INJECTION, POWDER, FOR SOLUTION INTRAMUSCULAR; INTRAVENOUS
Status: DISCONTINUED | OUTPATIENT
Start: 2024-07-09 | End: 2024-07-09

## 2024-07-09 RX ORDER — OXYCODONE AND ACETAMINOPHEN 5; 325 MG/1; MG/1
1 TABLET ORAL EVERY 6 HOURS PRN
Qty: 28 TABLET | Refills: 0 | Status: SHIPPED | OUTPATIENT
Start: 2024-07-09

## 2024-07-09 RX ORDER — MIDAZOLAM HYDROCHLORIDE 1 MG/ML
.5-4 INJECTION, SOLUTION INTRAMUSCULAR; INTRAVENOUS
Status: DISCONTINUED | OUTPATIENT
Start: 2024-07-09 | End: 2024-07-09 | Stop reason: HOSPADM

## 2024-07-09 RX ORDER — FENTANYL CITRATE 50 UG/ML
INJECTION, SOLUTION INTRAMUSCULAR; INTRAVENOUS
Status: DISCONTINUED | OUTPATIENT
Start: 2024-07-09 | End: 2024-07-09

## 2024-07-09 RX ORDER — LIDOCAINE HYDROCHLORIDE AND EPINEPHRINE 10; 10 MG/ML; UG/ML
INJECTION, SOLUTION INFILTRATION; PERINEURAL
Status: DISCONTINUED | OUTPATIENT
Start: 2024-07-09 | End: 2024-07-09 | Stop reason: HOSPADM

## 2024-07-09 RX ORDER — HALOPERIDOL 5 MG/ML
0.5 INJECTION INTRAMUSCULAR EVERY 10 MIN PRN
Status: DISCONTINUED | OUTPATIENT
Start: 2024-07-09 | End: 2024-07-09 | Stop reason: HOSPADM

## 2024-07-09 RX ORDER — GLUCAGON 1 MG
1 KIT INJECTION
Status: DISCONTINUED | OUTPATIENT
Start: 2024-07-09 | End: 2024-07-09 | Stop reason: HOSPADM

## 2024-07-09 RX ORDER — ESMOLOL HYDROCHLORIDE 10 MG/ML
INJECTION INTRAVENOUS
Status: DISCONTINUED | OUTPATIENT
Start: 2024-07-09 | End: 2024-07-09

## 2024-07-09 RX ORDER — ASPIRIN 81 MG/1
81 TABLET ORAL DAILY
COMMUNITY

## 2024-07-09 RX ORDER — DEXMEDETOMIDINE HYDROCHLORIDE 100 UG/ML
INJECTION, SOLUTION INTRAVENOUS
Status: DISCONTINUED | OUTPATIENT
Start: 2024-07-09 | End: 2024-07-09

## 2024-07-09 RX ORDER — PHENYLEPHRINE HCL IN 0.9% NACL 1 MG/10 ML
SYRINGE (ML) INTRAVENOUS
Status: DISCONTINUED | OUTPATIENT
Start: 2024-07-09 | End: 2024-07-09

## 2024-07-09 RX ORDER — PROPOFOL 10 MG/ML
VIAL (ML) INTRAVENOUS
Status: DISCONTINUED | OUTPATIENT
Start: 2024-07-09 | End: 2024-07-09

## 2024-07-09 RX ORDER — FENTANYL CITRATE 50 UG/ML
25 INJECTION, SOLUTION INTRAMUSCULAR; INTRAVENOUS EVERY 5 MIN PRN
Status: DISCONTINUED | OUTPATIENT
Start: 2024-07-09 | End: 2024-07-09 | Stop reason: HOSPADM

## 2024-07-09 RX ORDER — BACITRACIN ZINC 500 UNIT/G
OINTMENT (GRAM) TOPICAL
Status: DISCONTINUED | OUTPATIENT
Start: 2024-07-09 | End: 2024-07-09 | Stop reason: HOSPADM

## 2024-07-09 RX ADMIN — SCOPALAMINE 1 PATCH: 1 PATCH, EXTENDED RELEASE TRANSDERMAL at 11:07

## 2024-07-09 RX ADMIN — CELECOXIB 400 MG: 200 CAPSULE ORAL at 11:07

## 2024-07-09 RX ADMIN — MIDAZOLAM 2 MG: 1 INJECTION INTRAMUSCULAR; INTRAVENOUS at 01:07

## 2024-07-09 RX ADMIN — ONDANSETRON 4 MG: 2 INJECTION INTRAMUSCULAR; INTRAVENOUS at 03:07

## 2024-07-09 RX ADMIN — DEXAMETHASONE SODIUM PHOSPHATE 4 MG: 4 INJECTION INTRA-ARTICULAR; INTRALESIONAL; INTRAMUSCULAR; INTRAVENOUS; SOFT TISSUE at 01:07

## 2024-07-09 RX ADMIN — FENTANYL CITRATE 25 MCG: 50 INJECTION INTRAMUSCULAR; INTRAVENOUS at 02:07

## 2024-07-09 RX ADMIN — PROPOFOL 20 MG: 10 INJECTION, EMULSION INTRAVENOUS at 01:07

## 2024-07-09 RX ADMIN — DEXMEDETOMIDINE 4 MCG: 100 INJECTION, SOLUTION, CONCENTRATE INTRAVENOUS at 03:07

## 2024-07-09 RX ADMIN — DEXMEDETOMIDINE 8 MCG: 100 INJECTION, SOLUTION, CONCENTRATE INTRAVENOUS at 04:07

## 2024-07-09 RX ADMIN — MUPIROCIN: 20 OINTMENT TOPICAL at 11:07

## 2024-07-09 RX ADMIN — Medication 100 MCG: at 03:07

## 2024-07-09 RX ADMIN — BUPIVACAINE HYDROCHLORIDE 25 ML: 7.5 INJECTION, SOLUTION EPIDURAL; RETROBULBAR at 01:07

## 2024-07-09 RX ADMIN — ACETAMINOPHEN 1000 MG: 500 TABLET ORAL at 11:07

## 2024-07-09 RX ADMIN — DEXMEDETOMIDINE 12 MCG: 100 INJECTION, SOLUTION, CONCENTRATE INTRAVENOUS at 03:07

## 2024-07-09 RX ADMIN — FENTANYL CITRATE 25 MCG: 50 INJECTION INTRAMUSCULAR; INTRAVENOUS at 05:07

## 2024-07-09 RX ADMIN — Medication 50 MCG: at 02:07

## 2024-07-09 RX ADMIN — ROCURONIUM BROMIDE 50 MG: 10 INJECTION, SOLUTION INTRAVENOUS at 01:07

## 2024-07-09 RX ADMIN — SUGAMMADEX 200 MG: 100 INJECTION, SOLUTION INTRAVENOUS at 04:07

## 2024-07-09 RX ADMIN — FENTANYL CITRATE 50 MCG: 50 INJECTION INTRAMUSCULAR; INTRAVENOUS at 01:07

## 2024-07-09 RX ADMIN — ROCURONIUM BROMIDE 20 MG: 10 INJECTION, SOLUTION INTRAVENOUS at 02:07

## 2024-07-09 RX ADMIN — PROPOFOL 120 MG: 10 INJECTION, EMULSION INTRAVENOUS at 01:07

## 2024-07-09 RX ADMIN — SODIUM CHLORIDE, SODIUM GLUCONATE, SODIUM ACETATE, POTASSIUM CHLORIDE AND MAGNESIUM CHLORIDE: 526; 502; 368; 37; 30 INJECTION, SOLUTION INTRAVENOUS at 01:07

## 2024-07-09 RX ADMIN — CEFAZOLIN 2 G: 330 INJECTION, POWDER, FOR SOLUTION INTRAMUSCULAR; INTRAVENOUS at 01:07

## 2024-07-09 RX ADMIN — Medication 100 MCG: at 02:07

## 2024-07-09 RX ADMIN — LIDOCAINE HYDROCHLORIDE 100 MG: 20 INJECTION, SOLUTION EPIDURAL; INFILTRATION; INTRACAUDAL at 01:07

## 2024-07-09 RX ADMIN — ESMOLOL HYDROCHLORIDE 10 MG: 10 INJECTION INTRAVENOUS at 02:07

## 2024-07-09 RX ADMIN — FENTANYL CITRATE 50 MCG: 50 INJECTION INTRAMUSCULAR; INTRAVENOUS at 02:07

## 2024-07-09 RX ADMIN — PROPOFOL 30 MG: 10 INJECTION, EMULSION INTRAVENOUS at 01:07

## 2024-07-09 NOTE — H&P
.Plastic and Reconstructive Surgery   H&P    Date:   2024    History of Present Illness:    64 y.o. female who presents for b/l breast reduction 2/2 symptomatic large pendulous breast. Endorses chronic neck and back pain not alleviated with medical management or therapy. Deep shoulder pain. Former smoker, currently 34G.     There is no change in H&P since last office visit. Will proceed with planned procedure.      Past Medical History:    has a past medical history of Abnormal Pap smear (2009), Hyperlipidemia, and Hypertension.    Past Surgical History:    has a past surgical history that includes Tubal ligation; tendonitis surgery bilaterally (Bilateral); and Colonoscopy.    Social History:  Social History     Tobacco Use    Smoking status: Former     Current packs/day: 0.00     Types: Cigarettes     Quit date: 2002     Years since quittin.5    Smokeless tobacco: Never   Substance Use Topics    Alcohol use: Yes     Alcohol/week: 5.0 standard drinks of alcohol     Types: 5 Standard drinks or equivalent per week     Comment: Socially     Social History     Substance and Sexual Activity   Drug Use Never       Family History:  Family History   Problem Relation Name Age of Onset    Hypertension Mother      Parkinsonism Father      Stroke Maternal Grandfather      Stroke Paternal Grandmother      Breast cancer Neg Hx      Colon cancer Neg Hx      Ovarian cancer Neg Hx         Allergies:  Review of patient's allergies indicates:  No Known Allergies    Home Medications:  Scheduled Meds:  Continuous Infusions:  PRN Meds:.      Review of Systems:  Negative except for what is noted in HPI    Physical Exam:  VITAL SIGNS:   There were no vitals filed for this visit.  TMAX: No data recorded.      General: Alert; No acute distress  Cardiovascular: Regular rate   Respiratory: Normal respiratory effort. Chest rise symmetric.   Abdomen: Soft, nontender, nondistended  Extremity: Moves all extremities  "equally.  Neurologic: No focal deficit. Speech normal  Breast: large pendulous breast         Diagnostic Data:  No results found for this or any previous visit (from the past 336 hour(s)).  No results found for this or any previous visit (from the past 336 hour(s)).  Lab Results   Component Value Date    ALBUMIN 4.3 04/19/2024     No results found for: "CRP"  No results found for: "INR", "PROTIME"  No results found for: "PTT"    Microbiology Results (last 7 days)       ** No results found for the last 168 hours. **            Assessment:  64 y.o.female w/ symptomatic macromastia    Plan:  Plan for bilateral breast reduction in OR  Consent obtained    Discussed with patient and/or family the risks and benefits of surgical intervention.  Conservative measures have been exhausted and patient would like to proceed with surgery.      We have discussed risks, which include but are not limited to blood clots in the legs that can travel to the lungs (pulmonary embolism). Pulmonary embolism can cause shortness of breath, chest pain, and even shock. Other risks include urinary tract infection, nausea and vomiting (usually related to pain medication), chronic pain, bleeding, nerve damage, blood vessel injury, scarring and infection, which can require re-operation. Furthermore, the risks of anesthesia include potential heart, lung, kidney, and liver damage.  Informed consent was obtained.  The patient understands and would like to proceed with surgery.    Jam Trejo MD  Plastic and Reconstructive Surgery Fellow    "

## 2024-07-09 NOTE — ANESTHESIA PROCEDURE NOTES
BL MAC SS    Patient location during procedure: pre-op   Block not for primary anesthetic.  Reason for block: at surgeon's request and post-op pain management   Post-op Pain Location: Chest Pain   Start time: 7/9/2024 1:38 PM  Timeout: 7/9/2024 1:38 PM   End time: 7/9/2024 1:41 PM    Staffing  Authorizing Provider: Jamison Forman MD  Performing Provider: Sharlene Rodriguez MD    Staffing  Performed by: Sharlene Rodriguez MD  Authorized by: Jamison Forman MD    Preanesthetic Checklist  Completed: patient identified, IV checked, site marked, risks and benefits discussed, surgical consent, monitors and equipment checked, pre-op evaluation and timeout performed  Peripheral Block  Patient position: sitting  Prep: ChloraPrep  Patient monitoring: heart rate, cardiac monitor, continuous pulse ox, continuous capnometry and frequent blood pressure checks  Block type: erector spinae plane  Laterality: bilateral  Injection technique: single shot  Interspace: T3-4    Needle  Needle type: Tuohy   Needle gauge: 17 G  Needle length: 3.5 in  Needle localization: anatomical landmarks and ultrasound guidance   -ultrasound image captured on disc.  Assessment  Injection assessment: negative aspiration, negative parasthesia and local visualized surrounding nerve  Paresthesia pain: none  Heart rate change: no  Slow fractionated injection: yes  Pain Tolerance: comfortable throughout block and no complaints  Medications:    Medications: bupivacaine (pf) (MARCAINE) injection 0.75% - Perineural   25 mL - 7/9/2024 1:41:00 PM    Additional Notes  Patient tolerated well.  See [facility] RN record for vitals.

## 2024-07-09 NOTE — ANESTHESIA PREPROCEDURE EVALUATION
2024  Cynthia Zarco is a 64 y.o., female.    Pre-operative evaluation for Procedure(s) (LRB):  MAMMOPLASTY, REDUCTION, BILATERAL (Bilateral)    Cynthia Zarco is a 64 y.o. female     Patient Active Problem List   Diagnosis    Edema    Chronic bilateral low back pain without sciatica    Osteopenia    Mixed hyperlipidemia    Urge and stress incontinence    Hypothyroid    Hypertrophy of breast       Review of patient's allergies indicates:  No Known Allergies    No current facility-administered medications on file prior to encounter.     Current Outpatient Medications on File Prior to Encounter   Medication Sig Dispense Refill    acetaminophen (ARTHRITIS PAIN RELIEF, ACETAM, ORAL) Take by mouth.      LACTOBACILLUS ACIDOPHILUS (PROBIOTIC ORAL) Take by mouth.      multivitamin (THERAGRAN) per tablet Take by mouth. 1 Tablet Oral Every day      olmesartan (BENICAR) 20 MG tablet Take 1 tablet (20 mg total) by mouth once daily. 30 tablet 5    oxybutynin (DITROPAN-XL) 10 MG 24 hr tablet Take 1 tablet (10 mg total) by mouth once daily. 30 tablet 5    rosuvastatin (CRESTOR) 20 MG tablet Take 1 tablet (20 mg total) by mouth once daily. 30 tablet 5    spironolactone (ALDACTONE) 25 MG tablet Take 1 tablet (25 mg total) by mouth 2 (two) times daily. 60 tablet 5    estradioL (VAGIFEM) 10 mcg Tab Place 1 tablet (10 mcg total) vaginally twice a week. 8 tablet 11       Past Surgical History:   Procedure Laterality Date    COLONOSCOPY      tendonitis surgery bilaterally Bilateral     TUBAL LIGATION         Social History     Socioeconomic History    Marital status:      Spouse name: Sohan Zarco    Number of children: 2   Tobacco Use    Smoking status: Former     Current packs/day: 0.00     Types: Cigarettes     Quit date: 2002     Years since quittin.5    Smokeless tobacco: Never   Substance and Sexual  "Activity    Alcohol use: Yes     Alcohol/week: 5.0 standard drinks of alcohol     Types: 5 Standard drinks or equivalent per week     Comment: Socially    Drug use: Never    Sexual activity: Yes     Partners: Male     Birth control/protection: Surgical, See Surgical Hx     Comment: ..BTL         CBC: No results for input(s): "WBC", "RBC", "HGB", "HCT", "PLT", "MCV", "MCH", "MCHC" in the last 72 hours.    CMP: No results for input(s): "NA", "K", "CL", "CO2", "BUN", "CREATININE", "GLU", "MG", "PHOS", "CALCIUM", "ALBUMIN", "PROT", "ALKPHOS", "ALT", "AST", "BILITOT" in the last 72 hours.    INR  No results for input(s): "PT", "INR", "PROTIME", "APTT" in the last 72 hours.        Diagnostic Studies:      EKD Echo:  No results found for this or any previous visit.        Pre-op Assessment    I have reviewed the Patient Summary Reports.    I have reviewed the NPO Status.   I have reviewed the Medications.     Review of Systems  Anesthesia Hx:  No problems with previous Anesthesia               Denies Personal Hx of Anesthesia complications.                    Cardiovascular:  Exercise tolerance: good   Hypertension                                        Pulmonary:  Pulmonary Normal                       Renal/:  Renal/ Normal                 Hepatic/GI:  Hepatic/GI Normal                 Neurological:    Denies CVA.    Denies Seizures.                                    Physical Exam  General: Cooperative, Alert and Oriented    Airway:  Mallampati: II   Mouth Opening: Small, but > 3cm  TM Distance: Normal  Tongue: Normal  Neck ROM: Normal ROM    Dental:  Intact        Anesthesia Plan  Type of Anesthesia, risks & benefits discussed:    Anesthesia Type: Gen ETT  Intra-op Monitoring Plan: Standard ASA Monitors  Post Op Pain Control Plan: multimodal analgesia, IV/PO Opioids PRN and peripheral nerve block  Induction:  IV  Airway Plan: Direct, Post-Induction  Informed Consent: Informed consent signed with the " Patient and all parties understand the risks and agree with anesthesia plan.  All questions answered.   ASA Score: 2  Day of Surgery Review of History & Physical: H&P Update referred to the surgeon/provider.    Ready For Surgery From Anesthesia Perspective.     .

## 2024-07-09 NOTE — PLAN OF CARE
Discharge instructions given and explained to patient and  with verbalized understanding. VSS. Denies N/V, tolerating PO fluids. Voiding spontaneously. Medications delivered to the bedside by pharmacy. Log sheet and measuring cups given to record output of CRISTINA drains. Rates pain level as tolerable. IV removed. Pt left floor via W/C, stable for transport,  available for transport home.

## 2024-07-09 NOTE — BRIEF OP NOTE
[facility] ([facility])  Brief Operative Note    SUMMARY     Surgery Date: 7/9/2024     Surgeons and Role:     * Luis Daniel Peck MD - Primary     * Huey Zavala MD - Fellow     * Jam Trejo MD - Fellow    Assisting Surgeon: None    Pre-op Diagnosis:  Hypertrophy of breast [N62]    Post-op Diagnosis:  Post-Op Diagnosis Codes:     * Hypertrophy of breast [N62]    Procedure(s) (LRB):  MAMMOPLASTY, REDUCTION, BILATERAL (Bilateral)    Anesthesia: General    Implants:  * No implants in log *    Operative Findings:   Left 586 g  Right 511 g    Estimated Blood Loss: * No values recorded between 7/9/2024  2:16 PM and 7/9/2024  4:28 PM *    Estimated Blood Loss has been documented.         Specimens:   Specimen (24h ago, onward)       Start     Ordered    07/09/24 1525  Specimen to Pathology, Surgery Breast  Once        Comments: Pre-op Diagnosis: Hypertrophy of breast [N62]Procedure(s):MAMMOPLASTY, REDUCTION, BILATERAL Number of specimens: 2Name of specimens: 1. Left breast tissue - permanent2. Right breast tissue - permanent     References:    Click here for ordering Quick Tip   Question Answer Comment   Procedure Type: Breast    Specimen Class: Routine/Screening    Release to patient Immediate        07/09/24 1526                    [id number]

## 2024-07-09 NOTE — TRANSFER OF CARE
"Anesthesia Transfer of Care Note    Patient: Cynthia Zarco    Procedure(s) Performed: Procedure(s) (LRB):  MAMMOPLASTY, REDUCTION, BILATERAL (Bilateral)    Patient location: [facility]    Anesthesia Type: general    Transport from OR: Transported from OR on 6-10 L/min O2 by face mask with adequate spontaneous ventilation    Post pain: adequate analgesia    Post assessment: no apparent anesthetic complications and tolerated procedure well    Post vital signs: stable    Level of consciousness: awake and alert    Nausea/Vomiting: no nausea/vomiting    Complications: none    Transfer of care protocol was followed      Last vitals: Visit Vitals  BP (!) 144/60 (BP Location: Right arm, Patient Position: Sitting)   Pulse 82   Temp 36.5 °C (97.7 °F) (Temporal)   Resp 12   Ht 4' 8" (1.422 m)   Wt 52.7 kg (116 lb 2.9 oz)   LMP 08/22/2008   SpO2 100%   Breastfeeding No   BMI 26.05 kg/m²     "

## 2024-07-09 NOTE — OP NOTE
Date of Surgery: 7/9/24  Pre op Diagnosis:  1 Hypertrophy of breast  2 Erythema intertrigo  3 Cervicalgia  4 Chronic pain in left shoulder  5 Chronic pain in right shoulder  Post op Diagnosis:  same  Procedure performed: bilateral breast reduction  Surgeon: Dr Luis Daniel Peck  Anesthesia: general  Complications none  Blood loss 150cc     The patient was evaluated in the preoperative holding area. Markings for the inferior pedicle bilateral breast reduction were made. The risks and possible complications including but not limited to; infection, bleeding, scarring, loss of sensation to the nipple, partial or total loss of the nipple, breast asymmetry, nipple malposition, breast deformity, open wounds and need for further surgery were all explained to the patient. Discussed with patient that we will not remove any axillary tissue or tissue extending towards the back.  Patient states that she would like to be a C cup I said that I would try to do so but could not guarantee it.  The patient fully understands that she may have axillary fullness.The patient signed the informed consent.     The patient was taken to the operating room and placed in the supine position. After adequate general endotracheal anesthesia, the patient was prepped and draped in the normal sterile fashion. The new nipple areola was marked measuring 42mm. The inferior pedicle was marked measuring 11cm at its base. The inferior pedicle was de-epithelialized. Good punctate bleeding was noted. The supra- medial and supra-lateral flaps were elevated to the clavicle superiorly, the lateral border of the sternum medially and the mid-axillary line laterally. The medial and lateral skin wedges were excise. The inferior pedicle was debulked. A similar procedure was performed on the opposite side. The incision's were closed using interrupted 3.0 monocryl followed by a running 4.0 monocryl subcuticular suture for the infra-mammary incision. The vertical  limb and nipple areolar complex were closed using interrupted 4.0 monocryl followed by a running 4.0 monocryl subcuticular suture. A similar closure was performed on the opposite side.    Weight removed right breast: 480  Weight removed left breast.560

## 2024-07-09 NOTE — DISCHARGE INSTRUCTIONS
.Keep CRISTINA drains in place and monitor output, record in log for office visit   Okay to shower in 48hrs, avoid submerging the incisions in bath tub/pool  Use of narcotics can lead to constipation, recommend taking a stool softener while on medications  Take medications as prescribed  F/u in clinic in 1 week  Call the office at [phone] with any questions or concerns

## 2024-07-09 NOTE — ANESTHESIA PROCEDURE NOTES
Intubation    Date/Time: 7/9/2024 1:54 PM    Performed by: Dante Auguste MD  Authorized by: Dante Auguste MD    Intubation:     Induction:  Intravenous    Intubated:  Postinduction    Mask Ventilation:  Easy with oral airway    Attempts:  1    Attempted By:  Staff anesthesiologist    Method of Intubation:  Direct    Blade:  Brooks 2    Laryngeal View Grade: Grade I - full view of cords      Difficult Airway Encountered?: No      Complications:  None    Airway Device:  Oral endotracheal tube    Airway Device Size:  7.0    Style/Cuff Inflation:  Cuffed (inflated to minimal occlusive pressure)    Inflation Amount (mL):  5    Tube secured:  21    Secured at:  The teeth    Placement Verified By:  Capnometry    Complicating Factors:  None    Findings Post-Intubation:  BS equal bilateral

## 2024-07-10 NOTE — ANESTHESIA POSTPROCEDURE EVALUATION
Anesthesia Post Evaluation    Patient: Cynthia Zarco    Procedure(s) Performed: Procedure(s) (LRB):  MAMMOPLASTY, REDUCTION, BILATERAL (Bilateral)    Final Anesthesia Type: general      Patient location during evaluation: PACU  Patient participation: Yes- Able to Participate  Level of consciousness: awake and alert and oriented  Post-procedure vital signs: reviewed and stable  Pain management: adequate  Airway patency: patent    PONV status at discharge: No PONV  Anesthetic complications: no      Cardiovascular status: blood pressure returned to baseline and hemodynamically stable  Respiratory status: unassisted and spontaneous ventilation  Hydration status: euvolemic  Follow-up not needed.              Vitals Value Taken Time   /56 07/09/24 1630   Temp 36.6 °C (97.9 °F) 07/09/24 1630   Pulse 81 07/09/24 1730   Resp 19 07/09/24 1715   SpO2 100 % 07/09/24 1730         No case tracking events are documented in the log.      Pain/Naz Score: Pain Rating Prior to Med Admin: 8 (7/9/2024  5:09 PM)  Naz Score: 9 (7/9/2024  5:15 PM)

## 2024-07-12 LAB
FINAL PATHOLOGIC DIAGNOSIS: NORMAL
GROSS: NORMAL
Lab: NORMAL
MICROSCOPIC EXAM: NORMAL

## 2024-07-17 ENCOUNTER — OFFICE VISIT (OUTPATIENT)
Dept: PLASTIC SURGERY | Facility: CLINIC | Age: 64
End: 2024-07-17
Payer: COMMERCIAL

## 2024-07-17 VITALS
WEIGHT: 116.19 LBS | SYSTOLIC BLOOD PRESSURE: 124 MMHG | HEART RATE: 81 BPM | HEIGHT: 56 IN | BODY MASS INDEX: 26.14 KG/M2 | DIASTOLIC BLOOD PRESSURE: 60 MMHG | OXYGEN SATURATION: 97 %

## 2024-07-17 DIAGNOSIS — Z09 SURGERY FOLLOW-UP EXAMINATION: Primary | ICD-10-CM

## 2024-07-17 PROCEDURE — 99999 PR PBB SHADOW E&M-EST. PATIENT-LVL III: CPT | Mod: PBBFAC,,, | Performed by: SURGERY

## 2024-07-17 PROCEDURE — 99024 POSTOP FOLLOW-UP VISIT: CPT | Mod: S$GLB,,, | Performed by: SURGERY

## 2024-07-17 NOTE — PROGRESS NOTES
[facility]ynthia [facility]co [facility] for a follow up visit status post bilateral breast reduction on 7/9/2024 by Dr. Luis Daniel Peck. She is doing well today. She reports that she has been using Neosporin instead of bacitracin around her nipples. She states her left drain is <20 ccs in a 24 hour period and her right drain < 30 ccs in a 24 hour period. She is here today with her . She denies fever, chills, nausea, vomiting or other systemic signs of infection.     ROS - negative, other than stated above in HPI    PHYSICAL EXAMINATION  Vitals:    07/17/24 1056   BP: 124/60   Pulse: 81     WD WN NAD  VSS  Normal respiratory effort  R breast - incision CDI, no erythema or drainage, nipple viable + sensation, allergic reaction around nipples, drain with SS output, tape in place  L breast - incision CDI, no erythema or drainage, nipple viable + sensation, allergic reaction around nipples, drain with SS output, tape in place    ASSESSMENT/PLAN  64 y.o. F s/p bilateral breast reduction   - Bilateral breast drains removed today with no complications.   - Pt was seen and evaluated by myself and Dr. Luis Daniel Peck.   - Pt instructed to not use any ointments on her bilateral nipples as she seems to be having an allergic reaction. Pt agreeable.   - RTC x 2 week(s). Staff to schedule.      All questions were answered. The patient was advised to call the clinic with any questions or concerns prior to their next visit.       Lakeisah Hutton PA-C  Plastic and Reconstructive Surgery   [phone]

## 2024-07-31 ENCOUNTER — OFFICE VISIT (OUTPATIENT)
Dept: PLASTIC SURGERY | Facility: CLINIC | Age: 64
End: 2024-07-31
Payer: COMMERCIAL

## 2024-07-31 VITALS
BODY MASS INDEX: 26.14 KG/M2 | HEIGHT: 56 IN | WEIGHT: 116.19 LBS | SYSTOLIC BLOOD PRESSURE: 124 MMHG | DIASTOLIC BLOOD PRESSURE: 67 MMHG

## 2024-07-31 DIAGNOSIS — Z09 SURGERY FOLLOW-UP EXAMINATION: Primary | ICD-10-CM

## 2024-07-31 DIAGNOSIS — R23.8 FACIAL AGING: ICD-10-CM

## 2024-07-31 PROCEDURE — 99999 PR PBB SHADOW E&M-EST. PATIENT-LVL III: CPT | Mod: PBBFAC,,, | Performed by: SURGERY

## 2024-07-31 PROCEDURE — 99024 POSTOP FOLLOW-UP VISIT: CPT | Mod: S$GLB,,, | Performed by: SURGERY

## 2024-07-31 NOTE — PROGRESS NOTES
Cynthia Zarco [facility] 7[facility]2024 for a follow up visit status post bilateral breast reduction on 7/9/2024 by Dr. Luis Daniel Peck. She is doing well today. She reports that she has been using Neosporin instead of bacitracin around her nipples. She states her left drain is <20 ccs in a 24 hour period and her right drain < 30 ccs in a 24 hour period. She is here today with her . She denies fever, chills, nausea, vomiting or other systemic signs of infection.     Interval History 7/31/2024:  Pt reports she is pleased with her results. She does note hypersensitive nipples. She would also like scar cream recommendations.   Pt would also like to discuss neck lift. 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     ROS - negative, other than stated above in HPI    PHYSICAL EXAMINATION  Vitals:    07/31/24 1124   BP: 124/67     WD WN NAD  VSS  Normal respiratory effort  R breast - incision CDI, no erythema or drainage, nipple viable + sensation,  tape in place  L breast - incision CDI, no erythema or drainage, nipple viable + sensation, tape in place    ASSESSMENT/PLAN  64 y.o. F s/p bilateral breast reduction   - Tape removed today with no complications.   - Pt was seen and evaluated by myself and Dr. Luis Daniel Peck.   - Pt instructed to use silicone tape or Moderna. Pt agreeable.   - Will plan for neck lift. Fees to be quoted. Risks and benefits explained. Pt will call us if she would like to proceed.   - RTC x 6 week(s). Staff to schedule.      All questions were answered. The patient was advised to call the clinic with any questions or concerns prior to their next visit.       Lakeisha Hutton PA-C  Plastic and Reconstructive Surgery   [phone]

## 2024-08-19 ENCOUNTER — TELEPHONE (OUTPATIENT)
Dept: FAMILY MEDICINE | Facility: CLINIC | Age: 64
End: 2024-08-19
Payer: COMMERCIAL

## 2024-08-19 RX ORDER — SPIRONOLACTONE 25 MG/1
25 TABLET ORAL 2 TIMES DAILY
Qty: 60 TABLET | Refills: 5 | Status: SHIPPED | OUTPATIENT
Start: 2024-08-19

## 2024-08-19 RX ORDER — SPIRONOLACTONE 25 MG/1
25 TABLET ORAL 2 TIMES DAILY
Qty: 60 TABLET | Refills: 5 | Status: SHIPPED | OUTPATIENT
Start: 2024-08-19 | End: 2024-08-19 | Stop reason: SDUPTHER

## 2024-08-19 NOTE — TELEPHONE ENCOUNTER
Pt requesting medication refill. LOV: 04/24/24  Pharm: [id number] - [place], [place] - [address]  Requested Prescriptions     Pending Prescriptions Disp Refills    spironolactone (ALDACTONE) 25 MG tablet 60 tablet 5     Sig: Take 1 tablet (25 mg total) by mouth 2 (two) times daily.

## 2024-08-19 NOTE — TELEPHONE ENCOUNTER
----- Message from Gopi Mccollum sent at 2024 12:59 PM CDT -----  Contact: self  Cynthia Zarco  MRN: [id number]  : 1960  PCP: Timmy Tong  Home Phone      [phone]  Work Phone      Not on file.  Mobile          [phone]      MESSAGE:   Patient is having a hard time getting medication, spironolactone (ALDACTONE) 25 MG tablet refilled. Please advise patient      [phone]

## 2024-08-20 NOTE — TELEPHONE ENCOUNTER
----- Message from Miriam Rubio LPN sent at 2024  5:04 PM CDT -----  Contact: self    ----- Message -----  From: Gopi Mccollum  Sent: 2024   1:00 PM CDT  To: Harold[facility]AI[facility] Staff    Cynthia Zarco  MRN: [id number]  : 1960  PCP: Timmy Tong  Home Phone      [phone]  Work Phone      Not on file.  Mobile          [phone]      MESSAGE:   Patient is having a hard time getting medication, spironolactone (ALDACTONE) 25 MG tablet refilled. Please advise patient      [phone]

## 2024-08-20 NOTE — TELEPHONE ENCOUNTER
Ok. Im sending nit to [facility]. I feel like they are able to get meds other pharmacies can't

## 2024-09-17 RX ORDER — SPIRONOLACTONE 25 MG/1
25 TABLET ORAL 2 TIMES DAILY
Qty: 60 TABLET | Refills: 5 | Status: SHIPPED | OUTPATIENT
Start: 2024-09-17

## 2024-09-17 NOTE — TELEPHONE ENCOUNTER
No care due was identified.  [facility] Embedded Care Due Messages. Reference number: [id number].   9/17/2024 2:06:37 PM CDT

## 2024-09-25 ENCOUNTER — OFFICE VISIT (OUTPATIENT)
Dept: PLASTIC SURGERY | Facility: CLINIC | Age: 64
End: 2024-09-25
Payer: COMMERCIAL

## 2024-09-25 VITALS
HEART RATE: 95 BPM | SYSTOLIC BLOOD PRESSURE: 134 MMHG | BODY MASS INDEX: 26.14 KG/M2 | WEIGHT: 116.19 LBS | DIASTOLIC BLOOD PRESSURE: 76 MMHG | HEIGHT: 56 IN

## 2024-09-25 DIAGNOSIS — Z09 SURGERY FOLLOW-UP EXAMINATION: Primary | ICD-10-CM

## 2024-09-25 PROCEDURE — 99999 PR PBB SHADOW E&M-EST. PATIENT-LVL III: CPT | Mod: PBBFAC,,,

## 2024-09-25 PROCEDURE — 99024 POSTOP FOLLOW-UP VISIT: CPT | Mod: S$GLB,,,

## 2024-09-25 NOTE — PROGRESS NOTES
Cynthia Zarco presents [facility] on 7/17/2024 for a follow up visit status post bilateral breast reduction on 7/9/2024 by Dr. Luis Daniel Peck. She is doing well today. She reports that she has been using Neosporin instead of bacitracin around her nipples. She states her left drain is <20 ccs in a 24 hour period and her right drain < 30 ccs in a 24 hour period. She is here today with her . She denies fever, chills, nausea, vomiting or other systemic signs of infection.     Interval History 7/31/2024:  Pt reports she is pleased with her results. She does note hypersensitive nipples. She would also like scar cream recommendations.   Pt would also like to discuss neck lift. 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     Interval History 9/25/2024:  Pt reports she has been using silicone tape and Mederma along her incisions. She would also like more information on a neck lift.    ROS - negative, other than stated above in HPI    PHYSICAL EXAMINATION  Vitals:    09/25/24 1051   BP: 134/76   Pulse: 95     WD WN NAD  VSS  Normal respiratory effort  R breast - incision CDI, no erythema or drainage, nipple viable + sensation  L breast - incision CDI, no erythema or drainage, nipple viable + sensation    ASSESSMENT/PLAN  64 y.o. F s/p bilateral breast reduction   - Pt was seen and evaluated by myself and Dr. Luis Daniel Peck.   - Doing well, no issues.   - Answered all questions regarding neck lift. Pt will reach out to us if she would like to schedule surgery. Risks and benefits were explained along with the procedure.   - RTC as needed. Staff to schedule.      All questions were answered. The patient was advised to call the clinic with any questions or concerns prior to their next visit.       Lakeisha Hutton PA-C  Plastic and Reconstructive Surgery   [phone]

## 2024-10-29 ENCOUNTER — OFFICE VISIT (OUTPATIENT)
Dept: FAMILY MEDICINE | Facility: CLINIC | Age: 64
End: 2024-10-29
Payer: COMMERCIAL

## 2024-10-29 VITALS
DIASTOLIC BLOOD PRESSURE: 70 MMHG | WEIGHT: 109.69 LBS | OXYGEN SATURATION: 97 % | RESPIRATION RATE: 20 BRPM | SYSTOLIC BLOOD PRESSURE: 124 MMHG | HEIGHT: 56 IN | HEART RATE: 80 BPM | BODY MASS INDEX: 24.67 KG/M2

## 2024-10-29 DIAGNOSIS — I10 PRIMARY HYPERTENSION: ICD-10-CM

## 2024-10-29 DIAGNOSIS — E78.2 MIXED HYPERLIPIDEMIA: ICD-10-CM

## 2024-10-29 DIAGNOSIS — M85.80 OSTEOPENIA, UNSPECIFIED LOCATION: Primary | ICD-10-CM

## 2024-10-29 DIAGNOSIS — N39.46 URGE AND STRESS INCONTINENCE: ICD-10-CM

## 2024-10-29 DIAGNOSIS — F41.1 ANXIETY STATE: ICD-10-CM

## 2024-10-29 PROCEDURE — G2211 COMPLEX E/M VISIT ADD ON: HCPCS | Mod: S$GLB,,, | Performed by: FAMILY MEDICINE

## 2024-10-29 PROCEDURE — 99214 OFFICE O/P EST MOD 30 MIN: CPT | Mod: S$GLB,,, | Performed by: FAMILY MEDICINE

## 2024-10-29 PROCEDURE — 99999 PR PBB SHADOW E&M-EST. PATIENT-LVL III: CPT | Mod: PBBFAC,,, | Performed by: FAMILY MEDICINE

## 2024-10-29 RX ORDER — OXYBUTYNIN CHLORIDE 10 MG/1
10 TABLET, EXTENDED RELEASE ORAL DAILY
Qty: 30 TABLET | Refills: 5 | Status: SHIPPED | OUTPATIENT
Start: 2024-10-29

## 2024-10-29 RX ORDER — ROSUVASTATIN CALCIUM 20 MG/1
20 TABLET, COATED ORAL DAILY
Qty: 30 TABLET | Refills: 5 | Status: SHIPPED | OUTPATIENT
Start: 2024-10-29 | End: 2025-10-29

## 2024-10-29 RX ORDER — OLMESARTAN MEDOXOMIL 20 MG/1
20 TABLET ORAL DAILY
Qty: 30 TABLET | Refills: 5 | Status: SHIPPED | OUTPATIENT
Start: 2024-10-29 | End: 2025-10-29

## 2024-10-29 RX ORDER — ESCITALOPRAM OXALATE 10 MG/1
10 TABLET ORAL DAILY
Qty: 30 TABLET | Refills: 1 | Status: SHIPPED | OUTPATIENT
Start: 2024-10-29 | End: 2025-10-29

## 2024-11-26 ENCOUNTER — OFFICE VISIT (OUTPATIENT)
Dept: FAMILY MEDICINE | Facility: CLINIC | Age: 64
End: 2024-11-26
Payer: COMMERCIAL

## 2024-11-26 VITALS
HEART RATE: 95 BPM | SYSTOLIC BLOOD PRESSURE: 130 MMHG | WEIGHT: 104.63 LBS | OXYGEN SATURATION: 92 % | DIASTOLIC BLOOD PRESSURE: 70 MMHG | BODY MASS INDEX: 23.54 KG/M2 | RESPIRATION RATE: 16 BRPM | HEIGHT: 56 IN

## 2024-11-26 DIAGNOSIS — F41.1 ANXIETY STATE: Primary | ICD-10-CM

## 2024-11-26 PROCEDURE — 99999 PR PBB SHADOW E&M-EST. PATIENT-LVL IV: CPT | Mod: PBBFAC,,, | Performed by: FAMILY MEDICINE

## 2024-11-26 PROCEDURE — 99214 OFFICE O/P EST MOD 30 MIN: CPT | Mod: S$GLB,,, | Performed by: FAMILY MEDICINE

## 2024-11-26 RX ORDER — ESCITALOPRAM OXALATE 10 MG/1
10 TABLET ORAL DAILY
Qty: 30 TABLET | Refills: 5 | Status: SHIPPED | OUTPATIENT
Start: 2024-11-26 | End: 2024-11-26 | Stop reason: SDUPTHER

## 2024-11-26 RX ORDER — ESCITALOPRAM OXALATE 10 MG/1
10 TABLET ORAL DAILY
Qty: 90 TABLET | Refills: 1 | Status: SHIPPED | OUTPATIENT
Start: 2024-11-26 | End: 2025-11-26

## 2024-11-26 NOTE — PROGRESS NOTES
Subjective:       Patient ID: Cyntiha Zarco is a 64 y.o. female.    Chief Complaint: Follow-up (Patient is here today for a 1 month follow up visit. )    Pt is a 64 y.o. female who presents for evaluation and management of   Encounter Diagnosis   Name Primary?    Anxiety state Yes   .  History of Present Illness    CHIEF COMPLAINT:  Cynthia presents today for follow-up after recent surgery and to discuss medication effectiveness.    RECENT SURGERY AND WEIGHT:  She reports ongoing recovery from recent surgery. She notes unintentional weight loss since the procedure, dropping from approximately 116 lbs pre-surgery. She expresses feeling better at her current smaller size but does not want to lose any more weight.    MEDICATIONS:  She is uncertain about the effectiveness of Lexapro after 28-30 days of use. She experiences nausea as a side effect, particularly in the morning when taking her blood pressure medication on an empty stomach. She reports improved sleep since starting Lexapro. She takes Lexapro at night with her evening meal, typically consisting of salad and fish. She uses vaginal inserts as prescribed and is requesting a refill. She is interested in obtaining a 90-day supply for most of her medications, including the blood pressure medicine.    BEHAVIORAL CHANGES:  She observes slight behavioral changes since starting Lexapro, noting that her  has noticed she still adjusts crooked pictures and picks up items from the floor.    PREVENTIVE CARE:  She is due for preventive care screenings. Her last pap smear was in December of last year. She inquires about scheduling a mammogram, typically done concurrently with her pap smear. She recalls being advised to wait six months following her July surgery before scheduling a mammogram, which would be due in January.         Review of Systems   Cardiovascular:  Negative for chest pain.   Psychiatric/Behavioral:  Negative for dysphoric mood and suicidal ideas.  The patient is not nervous/anxious.        Objective:      Physical Exam  Constitutional:       Appearance: She is well-developed.   HENT:      Head: Normocephalic and atraumatic.      Right Ear: External ear normal.      Left Ear: External ear normal.      Nose: Nose normal.   Eyes:      Pupils: Pupils are equal, round, and reactive to light.   Neck:      Thyroid: No thyromegaly.      Vascular: No JVD.      Trachea: No tracheal deviation.   Cardiovascular:      Rate and Rhythm: Normal rate.      Heart sounds: Normal heart sounds. No murmur heard.  Pulmonary:      Effort: Pulmonary effort is normal. No respiratory distress.      Breath sounds: Normal breath sounds. No wheezing or rales.   Chest:      Chest wall: No tenderness.   Abdominal:      General: Bowel sounds are normal. There is no distension.      Palpations: Abdomen is soft. There is no mass.      Tenderness: There is no abdominal tenderness. There is no guarding or rebound.   Musculoskeletal:         General: No tenderness. Normal range of motion.      Cervical back: Normal range of motion and neck supple.   Lymphadenopathy:      Cervical: No cervical adenopathy.   Skin:     General: Skin is warm and dry.      Coloration: Skin is not pale.      Findings: No erythema or rash.   Neurological:      Mental Status: She is alert and oriented to person, place, and time.      Cranial Nerves: No cranial nerve deficit.      Motor: No abnormal muscle tone.      Coordination: Coordination normal.      Deep Tendon Reflexes: Reflexes are normal and symmetric. Reflexes normal.   Psychiatric:         Behavior: Behavior normal.         Thought Content: Thought content normal.         Judgment: Judgment normal.         Assessment:       1. Anxiety state        Plan:   1. Anxiety state  -     Discontinue: EScitalopram oxalate (LEXAPRO) 10 MG tablet; Take 1 tablet (10 mg total) by mouth once daily.  Dispense: 30 tablet; Refill: 5  -     EScitalopram oxalate (LEXAPRO) 10 MG  tablet; Take 1 tablet (10 mg total) by mouth once daily.  Dispense: 90 tablet; Refill: 1      No follow-ups on file.  Assessment & Plan    Continued Lexapro for depression/anxiety; patient reports some improvement in sleep and ability to relax  Noted patient's recent weight loss (now 104 lbs) and advised against further weight reduction  Deferred mammogram as per Dr. Albarran' recommendation to wait 6 months post-surgery (July procedure)  Will message Dr. Albarran to confirm timing for mammogram, expected around January    GENERALIZED ANXIETY DISORDER:  - Educated patient on typical timeline for SSRI effectiveness, noting full effect usually seen around 3-4 weeks.  - Explained that nausea is a common initial side effect of Lexapro that typically improves with time.  - Continued Lexapro at current dosage, to be taken at night with supper.  - Refilled Lexapro with 90-day supply.  - Follow up in 3 months to reassess Lexapro effectiveness.    ESSENTIAL HYPERTENSION:  - Refilled blood pressure medication with 90-day supply.    FOLLOW-UP:  - Contact office if any issues arise before scheduled follow-up.         This note was generated with the assistance of ambient listening technology. Verbal consent was obtained by the patient and accompanying visitor(s) for the recording of patient appointment to facilitate this note. I attest to having reviewed and edited the generated note for accuracy, though some syntax or spelling errors may persist. Please contact the author of this note for any clarification.

## 2025-01-28 ENCOUNTER — HOSPITAL ENCOUNTER (OUTPATIENT)
Dept: RADIOLOGY | Facility: HOSPITAL | Age: 65
Discharge: HOME | End: 2025-01-28
Attending: FAMILY MEDICINE
Payer: COMMERCIAL

## 2025-01-28 ENCOUNTER — OFFICE VISIT (OUTPATIENT)
Dept: OBSTETRICS AND GYNECOLOGY | Facility: CLINIC | Age: 65
End: 2025-01-28
Payer: COMMERCIAL

## 2025-01-28 VITALS — WEIGHT: 104 LBS | BODY MASS INDEX: 23.39 KG/M2 | HEIGHT: 56 IN

## 2025-01-28 VITALS
BODY MASS INDEX: 24.79 KG/M2 | HEIGHT: 56 IN | SYSTOLIC BLOOD PRESSURE: 98 MMHG | HEART RATE: 92 BPM | DIASTOLIC BLOOD PRESSURE: 66 MMHG | WEIGHT: 110.19 LBS

## 2025-01-28 DIAGNOSIS — N95.2 POST-MENOPAUSAL ATROPHIC VAGINITIS: ICD-10-CM

## 2025-01-28 DIAGNOSIS — Z12.31 ENCOUNTER FOR SCREENING MAMMOGRAM FOR BREAST CANCER: ICD-10-CM

## 2025-01-28 DIAGNOSIS — Z01.419 WELL WOMAN EXAM WITH ROUTINE GYNECOLOGICAL EXAM: Primary | ICD-10-CM

## 2025-01-28 DIAGNOSIS — N94.10 DYSPAREUNIA IN FEMALE: ICD-10-CM

## 2025-01-28 PROCEDURE — 99999 PR PBB SHADOW E&M-EST. PATIENT-LVL III: CPT | Mod: PBBFAC,,, | Performed by: OBSTETRICS & GYNECOLOGY

## 2025-01-28 PROCEDURE — 77063 BREAST TOMOSYNTHESIS BI: CPT | Mod: 26,,, | Performed by: RADIOLOGY

## 2025-01-28 PROCEDURE — 77067 SCR MAMMO BI INCL CAD: CPT | Mod: 26,,, | Performed by: RADIOLOGY

## 2025-01-28 PROCEDURE — 99396 PREV VISIT EST AGE 40-64: CPT | Mod: S$GLB,,, | Performed by: OBSTETRICS & GYNECOLOGY

## 2025-01-28 PROCEDURE — 77063 BREAST TOMOSYNTHESIS BI: CPT | Mod: TC

## 2025-01-28 RX ORDER — ESTRADIOL 10 UG/1
1 INSERT VAGINAL
Qty: 8 TABLET | Refills: 11 | Status: SHIPPED | OUTPATIENT
Start: 2025-01-30 | End: 2026-01-30

## 2025-01-28 NOTE — PROGRESS NOTES
Subjective:    Patient ID: Cynthia Zarco is a 64 y.o. y.o. female.     Chief Complaint: Annual Well Woman Exam     History of Present Illness:  Cynthia presents today for Annual Well Woman exam. She describes her menses as  absent, menopause .She denies pelvic pain.  She denies breast tenderness, masses, nipple discharge. She denies GYN complaints. She denies difficulty with urination or bowel movements. She denies menopausal symptoms such as hotflashes, vaginal dryness, and night sweats. She denies bloating, early satiety, or weight changes. She is sexually active.      Menstrual History:   Patient's last menstrual period was 2008..     OB History          2    Para   2    Term   2            AB        Living   2         SAB        IAB        Ectopic        Multiple        Live Births                     The following portions of the patient's history were reviewed and updated as appropriate: allergies, current medications, past family history, past medical history, past social history, past surgical history, and problem list.    ROS:   Review of Systems   Constitutional:  Positive for fatigue. Negative for chills, diaphoresis, fever and unexpected weight change.   HENT:  Negative for congestion, hearing loss, postnasal drip, rhinorrhea, sinus pressure, sinus pain, sore throat and tinnitus.    Eyes:  Negative for pain, discharge and visual disturbance.   Respiratory:  Negative for apnea, cough, shortness of breath and wheezing.    Cardiovascular:  Negative for chest pain, palpitations and leg swelling.   Gastrointestinal:  Negative for abdominal pain, constipation, diarrhea, nausea and vomiting.   Endocrine: Negative for cold intolerance, heat intolerance, polydipsia, polyphagia and polyuria.   Genitourinary:  Positive for dyspareunia, enuresis and urgency. Negative for difficulty urinating, dysuria, flank pain, frequency, genital sores, hematuria, menstrual problem, pelvic pain, vaginal  bleeding, vaginal discharge and vaginal pain.   Musculoskeletal:  Positive for arthralgias, back pain and myalgias. Negative for joint swelling, neck pain and neck stiffness.   Skin:  Negative for color change, pallor and rash.   Allergic/Immunologic: Negative for environmental allergies, food allergies and immunocompromised state.   Neurological:  Negative for dizziness, weakness, light-headedness, numbness and headaches.   Hematological:  Negative for adenopathy. Does not bruise/bleed easily.   Psychiatric/Behavioral:  Positive for sleep disturbance. Negative for agitation and confusion. The patient is nervous/anxious.          Objective:    Vital Signs:  Vitals:    01/28/25 1148   BP: 98/66   Pulse: 92       Physical Exam:   Examination performed with Chaperone present  General:  alert, cooperative, no distress   Skin:  Skin color, texture, turgor normal. No rashes or lesions   HEENT:  extra ocular movement intact, sclera clear, anicteric   Neck: supple, trachea midline, no adenopathy or thyromegally   Respiratory:  Normal effort   Breasts:  breast reduction scars noted, no discharge, erythema, tenderness, or palpable masses; no axillary lymphadenopathy   Abdomen:  soft, nontender, no palpable masses   Pelvis: External genitalia: normal general appearance  Urinary system: urethral meatus normal, bladder nontender  Vaginal: normal without tenderness, induration or masses, atrophic mucosa  Cervix: normal appearance  Uterus: normal size, shape, position  Adnexa: non palpable   Extremities: Normal ROM; no edema, no cyanosis   Neurologial: Normal strength and tone. No focal numbness or weakness.   Psychiatric: normal mood, speech, dress, and thought processes         Assessment:       Healthy female exam.     1. Well woman exam with routine gynecological exam    2. Post-menopausal atrophic vaginitis    3. Dyspareunia in female          Plan:      Well woman exam with routine gynecological exam    Post-menopausal  atrophic vaginitis  -     estradioL (VAGIFEM) 10 mcg Tab; Place 1 tablet (10 mcg total) vaginally twice a week.  Dispense: 8 tablet; Refill: 11    Dyspareunia in female  -     estradioL (VAGIFEM) 10 mcg Tab; Place 1 tablet (10 mcg total) vaginally twice a week.  Dispense: 8 tablet; Refill: 11      Pap 2023 NEM with negative HPV  MMG negative today    COUNSELING:  Cynthia was counseled on [employer].C.O.G. Pap guidelines and recommendations for yearly pelvic exams in addition to recommendations for monthly self breast exams; to see her PCP for other health maintenance.

## 2025-02-10 DIAGNOSIS — N39.46 URGE AND STRESS INCONTINENCE: ICD-10-CM

## 2025-02-10 RX ORDER — OXYBUTYNIN CHLORIDE 10 MG/1
10 TABLET, EXTENDED RELEASE ORAL DAILY
Qty: 30 TABLET | Refills: 5 | Status: SHIPPED | OUTPATIENT
Start: 2025-02-10

## 2025-02-10 NOTE — TELEPHONE ENCOUNTER
Care Due:                  Date            Visit Type   Department     Provider  --------------------------------------------------------------------------------                                EP -                              [facility]  Last Visit: 11-      CARE ([facility])   DENA Tong                              EP -                              PRIMARY      [facility]  Next Visit: 02-      CARE ([facility])   DENA Tong                                                            Last  Test          Frequency    Reason                     Performed    Due Date  --------------------------------------------------------------------------------    CMP.........  12 months..  olmesartan, rosuvastatin,   04- 04-                             spironolactone...........    Lipid Panel.  12 months..  rosuvastatin.............  04- 04-    [facility] Embedded Care Due Messages. Reference number: [id number].   2/10/2025 2:20:40 PM CST

## 2025-02-10 NOTE — TELEPHONE ENCOUNTER
LOV 11/26/2024    patient requesting refill for   oxybutynin (DITROPAN-XL) 10 MG 24 hr tablet       RX pending   pharmacy confirmed  please advise

## 2025-02-26 ENCOUNTER — OFFICE VISIT (OUTPATIENT)
Dept: FAMILY MEDICINE | Facility: CLINIC | Age: 65
End: 2025-02-26
Payer: COMMERCIAL

## 2025-02-26 VITALS
RESPIRATION RATE: 17 BRPM | DIASTOLIC BLOOD PRESSURE: 66 MMHG | OXYGEN SATURATION: 97 % | HEART RATE: 74 BPM | BODY MASS INDEX: 24.75 KG/M2 | SYSTOLIC BLOOD PRESSURE: 110 MMHG | WEIGHT: 110 LBS | HEIGHT: 56 IN

## 2025-02-26 DIAGNOSIS — F41.1 ANXIETY STATE: Primary | ICD-10-CM

## 2025-02-26 DIAGNOSIS — I10 PRIMARY HYPERTENSION: ICD-10-CM

## 2025-02-26 DIAGNOSIS — G47.00 INSOMNIA, UNSPECIFIED TYPE: ICD-10-CM

## 2025-02-26 PROCEDURE — G2211 COMPLEX E/M VISIT ADD ON: HCPCS | Mod: S$GLB,,, | Performed by: FAMILY MEDICINE

## 2025-02-26 PROCEDURE — 99999 PR PBB SHADOW E&M-EST. PATIENT-LVL IV: CPT | Mod: PBBFAC,,, | Performed by: FAMILY MEDICINE

## 2025-02-26 PROCEDURE — 99214 OFFICE O/P EST MOD 30 MIN: CPT | Mod: S$GLB,,, | Performed by: FAMILY MEDICINE

## 2025-02-26 RX ORDER — BUSPIRONE HYDROCHLORIDE 7.5 MG/1
7.5 TABLET ORAL 3 TIMES DAILY
Qty: 90 TABLET | Refills: 1 | Status: SHIPPED | OUTPATIENT
Start: 2025-02-26 | End: 2026-02-26

## 2025-02-26 RX ORDER — TRAZODONE HYDROCHLORIDE 50 MG/1
50 TABLET ORAL NIGHTLY
Qty: 30 TABLET | Refills: 1 | Status: SHIPPED | OUTPATIENT
Start: 2025-02-26 | End: 2025-03-08

## 2025-02-26 NOTE — PROGRESS NOTES
Subjective:       Patient ID: Cynthia Zarco is a 64 y.o. female.    Chief Complaint: Follow-up (Pt here for 3mth f/u. )    Here to f/u since starting Lexapro. She does not noticed a difference in Anxiety since starting medication. She reports persistent worrying. Nausea has since resolved. She does reports weight gain.       Follow-up  Pertinent negatives include no nausea.     Review of Systems   Gastrointestinal:  Negative for nausea.   Psychiatric/Behavioral:  Positive for sleep disturbance. Negative for suicidal ideas. The patient is nervous/anxious.        Objective:      Physical Exam  Vitals reviewed.   Constitutional:       General: She is not in acute distress.  HENT:      Head: Normocephalic and atraumatic.      Nose: Nose normal.      Mouth/Throat:      Mouth: Mucous membranes are moist.   Eyes:      Extraocular Movements: Extraocular movements intact.      Pupils: Pupils are equal, round, and reactive to light.   Cardiovascular:      Rate and Rhythm: Normal rate.   Pulmonary:      Effort: Pulmonary effort is normal. No respiratory distress.      Breath sounds: No stridor. No wheezing or rales.   Abdominal:      General: Abdomen is flat. There is no distension.   Musculoskeletal:      Cervical back: Normal range of motion.   Skin:     General: Skin is warm and dry.   Neurological:      Mental Status: She is alert and oriented to person, place, and time.   Psychiatric:         Attention and Perception: Attention normal.         Mood and Affect: Mood normal.         Speech: Speech normal.         Behavior: Behavior normal.         Thought Content: Thought content normal.         Assessment:       1. Anxiety state    2. Insomnia, unspecified type        Plan:       1. Anxiety state  -     busPIRone (BUSPAR) 7.5 MG tablet; Take 1 tablet (7.5 mg total) by mouth 3 (three) times daily.  Dispense: 90 tablet; Refill: 1    2. Insomnia, unspecified type  -     traZODone (DESYREL) 50 MG tablet; Take 1 tablet (50 mg  total) by mouth every evening. for 10 days  Dispense: 30 tablet; Refill: 1      Stop lexapro and start above. The trazodone is more of a back up for insomnia. Will see if buspar helps first

## 2025-02-27 ENCOUNTER — TELEPHONE (OUTPATIENT)
Dept: FAMILY MEDICINE | Facility: CLINIC | Age: 65
End: 2025-02-27
Payer: COMMERCIAL

## 2025-02-27 NOTE — TELEPHONE ENCOUNTER
No care due was identified.  Health [facility] Embedded Care Due Messages. Reference number: [id number].   2/26/2025 8:12:45 PM CST

## 2025-02-27 NOTE — TELEPHONE ENCOUNTER
----- Message from Chapito sent at 2/27/2025  8:35 AM CST -----  Contact: Patient  Cynthia ZarcoMRN: [id number]: 1960PCP: Timmy TongHome Phone      Not on file.Work Phone      Not on file.YR.MRKT          [phone]: saw Dr Tong yesterday - given new Rx - has questions Re: MedicationCall [phone]: Cristela

## 2025-02-28 RX ORDER — OLMESARTAN MEDOXOMIL 20 MG/1
20 TABLET ORAL DAILY
Qty: 90 TABLET | Refills: 3 | Status: SHIPPED | OUTPATIENT
Start: 2025-02-28 | End: 2026-02-28

## 2025-03-21 NOTE — TELEPHONE ENCOUNTER
No care due was identified.  [facility] Embedded Care Due Messages. Reference number: [id number].   3/21/2025 8:35:47 AM CDT

## 2025-03-23 RX ORDER — SPIRONOLACTONE 25 MG/1
25 TABLET ORAL 2 TIMES DAILY
Qty: 60 TABLET | Refills: 5 | Status: SHIPPED | OUTPATIENT
Start: 2025-03-23 | End: 2025-03-26 | Stop reason: SDUPTHER

## 2025-03-26 ENCOUNTER — OFFICE VISIT (OUTPATIENT)
Dept: FAMILY MEDICINE | Facility: CLINIC | Age: 65
End: 2025-03-26
Payer: COMMERCIAL

## 2025-03-26 VITALS
BODY MASS INDEX: 24.94 KG/M2 | OXYGEN SATURATION: 98 % | DIASTOLIC BLOOD PRESSURE: 72 MMHG | HEART RATE: 85 BPM | HEIGHT: 56 IN | WEIGHT: 110.88 LBS | SYSTOLIC BLOOD PRESSURE: 124 MMHG | RESPIRATION RATE: 17 BRPM

## 2025-03-26 DIAGNOSIS — G47.00 INSOMNIA, UNSPECIFIED TYPE: ICD-10-CM

## 2025-03-26 DIAGNOSIS — E55.9 VITAMIN D DEFICIENCY DISEASE: ICD-10-CM

## 2025-03-26 DIAGNOSIS — E78.2 MIXED HYPERLIPIDEMIA: Primary | ICD-10-CM

## 2025-03-26 DIAGNOSIS — I10 PRIMARY HYPERTENSION: ICD-10-CM

## 2025-03-26 DIAGNOSIS — F41.1 ANXIETY STATE: ICD-10-CM

## 2025-03-26 PROCEDURE — 99999 PR PBB SHADOW E&M-EST. PATIENT-LVL III: CPT | Mod: PBBFAC,,, | Performed by: FAMILY MEDICINE

## 2025-03-26 PROCEDURE — 99214 OFFICE O/P EST MOD 30 MIN: CPT | Mod: S$GLB,,, | Performed by: FAMILY MEDICINE

## 2025-03-26 PROCEDURE — G2211 COMPLEX E/M VISIT ADD ON: HCPCS | Mod: S$GLB,,, | Performed by: FAMILY MEDICINE

## 2025-03-26 RX ORDER — SPIRONOLACTONE 25 MG/1
25 TABLET ORAL 2 TIMES DAILY
Qty: 90 TABLET | Refills: 1 | Status: SHIPPED | OUTPATIENT
Start: 2025-03-26

## 2025-03-26 RX ORDER — BUSPIRONE HYDROCHLORIDE 7.5 MG/1
7.5 TABLET ORAL 3 TIMES DAILY
Qty: 90 TABLET | Refills: 1 | Status: SHIPPED | OUTPATIENT
Start: 2025-03-26 | End: 2026-03-26

## 2025-03-26 RX ORDER — OLMESARTAN MEDOXOMIL 20 MG/1
20 TABLET ORAL DAILY
Qty: 90 TABLET | Refills: 3 | Status: SHIPPED | OUTPATIENT
Start: 2025-03-26 | End: 2026-03-26

## 2025-03-26 RX ORDER — TRAZODONE HYDROCHLORIDE 50 MG/1
50 TABLET ORAL NIGHTLY
Qty: 90 TABLET | Refills: 1 | Status: SHIPPED | OUTPATIENT
Start: 2025-03-26 | End: 2025-09-22

## 2025-03-26 NOTE — PROGRESS NOTES
Subjective:       Patient ID: Cynthia Zarco is a 64 y.o. female.    Chief Complaint: Follow-up (Pt here for 1 mth f/u. )    Pt is a 64 y.o. female who presents for evaluation and management of   Encounter Diagnoses   Name Primary?    Primary hypertension     Anxiety state     Insomnia, unspecified type     Mixed hyperlipidemia Yes    Vitamin D deficiency disease    .  Doing well on current meds. Denies any side effects. Prevention is up to date.  Review of Systems   Constitutional:  Negative for chills and fever.   Respiratory:  Negative for shortness of breath.    Cardiovascular:  Negative for chest pain and palpitations.   Gastrointestinal:  Negative for abdominal pain, blood in stool, constipation and nausea.   Genitourinary:  Negative for difficulty urinating.   Psychiatric/Behavioral:  Negative for dysphoric mood, sleep disturbance and suicidal ideas. The patient is not nervous/anxious.        Objective:      Physical Exam  Constitutional:       Appearance: She is well-developed.   HENT:      Head: Normocephalic and atraumatic.      Right Ear: External ear normal.      Left Ear: External ear normal.      Nose: Nose normal.   Eyes:      Pupils: Pupils are equal, round, and reactive to light.   Neck:      Thyroid: No thyromegaly.      Vascular: No JVD.      Trachea: No tracheal deviation.   Cardiovascular:      Rate and Rhythm: Normal rate.      Heart sounds: Normal heart sounds. No murmur heard.  Pulmonary:      Effort: Pulmonary effort is normal. No respiratory distress.      Breath sounds: Normal breath sounds. No wheezing or rales.   Chest:      Chest wall: No tenderness.   Abdominal:      General: Bowel sounds are normal. There is no distension.      Palpations: Abdomen is soft. There is no mass.      Tenderness: There is no abdominal tenderness. There is no guarding or rebound.   Musculoskeletal:         General: No tenderness. Normal range of motion.      Cervical back: Normal range of motion and neck  supple.   Lymphadenopathy:      Cervical: No cervical adenopathy.   Skin:     General: Skin is warm and dry.      Coloration: Skin is not pale.      Findings: No erythema or rash.   Neurological:      Mental Status: She is alert and oriented to person, place, and time.      Cranial Nerves: No cranial nerve deficit.      Motor: No abnormal muscle tone.      Coordination: Coordination normal.      Deep Tendon Reflexes: Reflexes are normal and symmetric. Reflexes normal.   Psychiatric:         Behavior: Behavior normal.         Thought Content: Thought content normal.         Judgment: Judgment normal.         Assessment:       1. Mixed hyperlipidemia    2. Primary hypertension    3. Anxiety state    4. Insomnia, unspecified type    5. Vitamin D deficiency disease        Plan:   1. Mixed hyperlipidemia  Overview:  Crestor  Repeat labs in 3 months     Lab Results   Component Value Date    LDLCALC 103.6 04/19/2024           Orders:  -     Comprehensive Metabolic Panel; Future; Expected date: 03/26/2025  -     Lipid Panel; Future; Expected date: 03/26/2025    2. Primary hypertension  -     olmesartan (BENICAR) 20 MG tablet; Take 1 tablet (20 mg total) by mouth once daily.  Dispense: 90 tablet; Refill: 3  -     spironolactone (ALDACTONE) 25 MG tablet; Take 1 tablet (25 mg total) by mouth 2 (two) times daily.  Dispense: 90 tablet; Refill: 1  -     Comprehensive Metabolic Panel; Future; Expected date: 03/26/2025  -     Lipid Panel; Future; Expected date: 03/26/2025    3. Anxiety state  Overview:  Buspar 7.5 TID   Failed lexapro       Orders:  -     busPIRone (BUSPAR) 7.5 MG tablet; Take 1 tablet (7.5 mg total) by mouth 3 (three) times daily.  Dispense: 90 tablet; Refill: 1    4. Insomnia, unspecified type  Overview:  Trazodone helping. Continue       Orders:  -     traZODone (DESYREL) 50 MG tablet; Take 1 tablet (50 mg total) by mouth every evening.  Dispense: 90 tablet; Refill: 1    5. Vitamin D deficiency disease  -      Vitamin D; Future; Expected date: 03/26/2025      No follow-ups on file.

## 2025-04-11 ENCOUNTER — CLINICAL SUPPORT (OUTPATIENT)
Dept: FAMILY MEDICINE | Facility: CLINIC | Age: 65
End: 2025-04-11
Payer: COMMERCIAL

## 2025-04-11 DIAGNOSIS — E78.2 MIXED HYPERLIPIDEMIA: ICD-10-CM

## 2025-04-11 DIAGNOSIS — E55.9 VITAMIN D DEFICIENCY DISEASE: ICD-10-CM

## 2025-04-11 DIAGNOSIS — I10 PRIMARY HYPERTENSION: ICD-10-CM

## 2025-04-11 LAB
ALBUMIN SERPL BCP-MCNC: 4.2 G/DL (ref 3.5–5.2)
ALP SERPL-CCNC: 40 UNIT/L (ref 40–150)
ALT SERPL W/O P-5'-P-CCNC: 21 UNIT/L (ref 10–44)
ANION GAP (OHS): 8 MMOL/L (ref 8–16)
AST SERPL-CCNC: 26 UNIT/L (ref 11–45)
BILIRUB SERPL-MCNC: 0.3 MG/DL (ref 0.1–1)
BUN SERPL-MCNC: 22 MG/DL (ref 8–23)
CALCIUM SERPL-MCNC: 9.2 MG/DL (ref 8.7–10.5)
CHLORIDE SERPL-SCNC: 108 MMOL/L (ref 95–110)
CHOLEST SERPL-MCNC: 154 MG/DL (ref 120–199)
CHOLEST/HDLC SERPL: 2.9 {RATIO} (ref 2–5)
CO2 SERPL-SCNC: 24 MMOL/L (ref 23–29)
CREAT SERPL-MCNC: 0.8 MG/DL (ref 0.5–1.4)
GFR SERPLBLD CREATININE-BSD FMLA CKD-EPI: >60 ML/MIN/1.73/M2
GLUCOSE SERPL-MCNC: 102 MG/DL (ref 70–110)
HDLC SERPL-MCNC: 54 MG/DL (ref 40–75)
HDLC SERPL: 35.1 % (ref 20–50)
LDLC SERPL CALC-MCNC: 85.8 MG/DL (ref 63–159)
NONHDLC SERPL-MCNC: 100 MG/DL
POTASSIUM SERPL-SCNC: 4.2 MMOL/L (ref 3.5–5.1)
PROT SERPL-MCNC: 7.5 GM/DL (ref 6–8.4)
SODIUM SERPL-SCNC: 140 MMOL/L (ref 136–145)
TRIGL SERPL-MCNC: 71 MG/DL (ref 30–150)

## 2025-04-11 PROCEDURE — 80061 LIPID PANEL: CPT

## 2025-04-11 PROCEDURE — 36415 COLL VENOUS BLD VENIPUNCTURE: CPT

## 2025-04-11 PROCEDURE — 80053 COMPREHEN METABOLIC PANEL: CPT

## 2025-04-11 PROCEDURE — 82306 VITAMIN D 25 HYDROXY: CPT

## 2025-04-12 LAB — 25(OH)D3+25(OH)D2 SERPL-MCNC: 63 NG/ML (ref 30–96)

## 2025-04-13 ENCOUNTER — RESULTS FOLLOW-UP (OUTPATIENT)
Dept: FAMILY MEDICINE | Facility: CLINIC | Age: 65
End: 2025-04-13

## 2025-04-23 DIAGNOSIS — F41.1 ANXIETY STATE: ICD-10-CM

## 2025-04-23 RX ORDER — BUSPIRONE HYDROCHLORIDE 7.5 MG/1
7.5 TABLET ORAL 3 TIMES DAILY
Qty: 90 TABLET | Refills: 1 | Status: SHIPPED | OUTPATIENT
Start: 2025-04-23 | End: 2026-04-23

## 2025-04-23 NOTE — TELEPHONE ENCOUNTER
No care due was identified.  Health [facility] Embedded Care Due Messages. Reference number: [id number].   4/23/2025 9:02:25 AM CDT

## 2025-04-29 DIAGNOSIS — E78.2 MIXED HYPERLIPIDEMIA: ICD-10-CM

## 2025-04-29 DIAGNOSIS — N39.46 URGE AND STRESS INCONTINENCE: ICD-10-CM

## 2025-04-29 NOTE — TELEPHONE ENCOUNTER
No care due was identified.  [facility] Embedded Care Due Messages. Reference number: [id number].   4/29/2025 5:24:43 PM CDT

## 2025-04-30 RX ORDER — ROSUVASTATIN CALCIUM 20 MG/1
20 TABLET, COATED ORAL DAILY
Qty: 30 TABLET | Refills: 5 | Status: SHIPPED | OUTPATIENT
Start: 2025-04-30 | End: 2026-04-30

## 2025-04-30 RX ORDER — OXYBUTYNIN CHLORIDE 10 MG/1
10 TABLET, EXTENDED RELEASE ORAL DAILY
Qty: 30 TABLET | Refills: 5 | Status: SHIPPED | OUTPATIENT
Start: 2025-04-30

## 2025-05-23 DIAGNOSIS — I10 PRIMARY HYPERTENSION: ICD-10-CM

## 2025-05-23 NOTE — TELEPHONE ENCOUNTER
No care due was identified.  Health [facility] Embedded Care Due Messages. Reference number: [id number].   5/23/2025 6:06:08 PM CDT

## 2025-05-26 RX ORDER — OLMESARTAN MEDOXOMIL 20 MG/1
20 TABLET ORAL DAILY
Qty: 90 TABLET | Refills: 3 | Status: SHIPPED | OUTPATIENT
Start: 2025-05-26 | End: 2026-05-26

## 2025-05-26 NOTE — TELEPHONE ENCOUNTER
LOV 03/26/2025    patient requesting refill for   olmesartan (BENICAR) 20 MG tablet       RX pending   pharmacy confirmed  please advise

## 2025-06-14 DIAGNOSIS — G47.00 INSOMNIA, UNSPECIFIED TYPE: ICD-10-CM

## 2025-06-14 NOTE — TELEPHONE ENCOUNTER
No care due was identified.  [facility] Embedded Care Due Messages. Reference number: [id number].   6/14/2025 4:55:38 PM CDT

## 2025-06-16 RX ORDER — TRAZODONE HYDROCHLORIDE 50 MG/1
50 TABLET ORAL NIGHTLY
Qty: 90 TABLET | Refills: 1 | Status: SHIPPED | OUTPATIENT
Start: 2025-06-16 | End: 2025-12-13

## 2025-07-06 DIAGNOSIS — F41.1 ANXIETY STATE: ICD-10-CM

## 2025-07-06 NOTE — TELEPHONE ENCOUNTER
No care due was identified.  [facility] Embedded Care Due Messages. Reference number: [id number].   7/06/2025 11:06:15 AM CDT

## 2025-07-07 RX ORDER — BUSPIRONE HYDROCHLORIDE 7.5 MG/1
7.5 TABLET ORAL 3 TIMES DAILY
Qty: 90 TABLET | Refills: 5 | Status: SHIPPED | OUTPATIENT
Start: 2025-07-07 | End: 2026-07-07

## 2025-07-22 ENCOUNTER — OFFICE VISIT (OUTPATIENT)
Dept: FAMILY MEDICINE | Facility: CLINIC | Age: 65
End: 2025-07-22
Payer: COMMERCIAL

## 2025-07-22 VITALS
DIASTOLIC BLOOD PRESSURE: 74 MMHG | SYSTOLIC BLOOD PRESSURE: 116 MMHG | OXYGEN SATURATION: 98 % | BODY MASS INDEX: 24.75 KG/M2 | HEART RATE: 83 BPM | WEIGHT: 110 LBS | HEIGHT: 56 IN | RESPIRATION RATE: 18 BRPM

## 2025-07-22 DIAGNOSIS — M72.2 PLANTAR FASCIITIS OF LEFT FOOT: Primary | ICD-10-CM

## 2025-07-22 DIAGNOSIS — E78.2 MIXED HYPERLIPIDEMIA: ICD-10-CM

## 2025-07-22 PROCEDURE — 99213 OFFICE O/P EST LOW 20 MIN: CPT | Mod: S$GLB,,, | Performed by: FAMILY MEDICINE

## 2025-07-22 PROCEDURE — 99999 PR PBB SHADOW E&M-EST. PATIENT-LVL IV: CPT | Mod: PBBFAC,,, | Performed by: FAMILY MEDICINE

## 2025-07-22 PROCEDURE — G2211 COMPLEX E/M VISIT ADD ON: HCPCS | Mod: S$GLB,,, | Performed by: FAMILY MEDICINE

## 2025-07-22 RX ORDER — ROSUVASTATIN CALCIUM 20 MG/1
20 TABLET, COATED ORAL DAILY
Qty: 30 TABLET | Refills: 5 | Status: SHIPPED | OUTPATIENT
Start: 2025-07-22 | End: 2026-07-22

## 2025-07-22 RX ORDER — CELECOXIB 200 MG/1
200 CAPSULE ORAL DAILY PRN
Qty: 30 CAPSULE | Refills: 2 | Status: SHIPPED | OUTPATIENT
Start: 2025-07-22

## 2025-07-22 NOTE — PROGRESS NOTES
Subjective:       Patient ID: Cynthia Zarco is a 65 y.o. female.    Chief Complaint: Foot Pain (Pt here for left foot pain. )    Pt is a 65 y.o. female who presents for evaluation and management of   Encounter Diagnoses   Name Primary?    Mixed hyperlipidemia     Plantar fasciitis of left foot Yes   .  Doing well on current meds. Denies any side effects. Prevention is up to date.    Review of Systems   Musculoskeletal:  Positive for gait problem.       Objective:      Physical Exam  Constitutional:       Appearance: She is well-developed.   HENT:      Head: Normocephalic and atraumatic.      Right Ear: External ear normal.      Left Ear: External ear normal.      Nose: Nose normal.   Eyes:      Pupils: Pupils are equal, round, and reactive to light.   Neck:      Thyroid: No thyromegaly.      Vascular: No JVD.      Trachea: No tracheal deviation.   Cardiovascular:      Rate and Rhythm: Normal rate.      Heart sounds: Normal heart sounds. No murmur heard.  Pulmonary:      Effort: Pulmonary effort is normal. No respiratory distress.      Breath sounds: Normal breath sounds. No wheezing or rales.   Chest:      Chest wall: No tenderness.   Abdominal:      General: Bowel sounds are normal. There is no distension.      Palpations: Abdomen is soft. There is no mass.      Tenderness: There is no abdominal tenderness. There is no guarding or rebound.   Musculoskeletal:         General: Tenderness present. Normal range of motion.      Cervical back: Normal range of motion and neck supple.      Comments: TTP left heel at insertion of PF      Lymphadenopathy:      Cervical: No cervical adenopathy.   Skin:     General: Skin is warm and dry.      Coloration: Skin is not pale.      Findings: No erythema or rash.   Neurological:      Mental Status: She is alert and oriented to person, place, and time.      Cranial Nerves: No cranial nerve deficit.      Motor: No abnormal muscle tone.      Coordination: Coordination normal.       Deep Tendon Reflexes: Reflexes are normal and symmetric. Reflexes normal.   Psychiatric:         Behavior: Behavior normal.         Thought Content: Thought content normal.         Judgment: Judgment normal.         Assessment:       1. Plantar fasciitis of left foot    2. Mixed hyperlipidemia        Plan:   1. Plantar fasciitis of left foot  -     celecoxib (CELEBREX) 200 MG capsule; Take 1 capsule (200 mg total) by mouth daily as needed for Pain (foot/heel pain).  Dispense: 30 capsule; Refill: 2  -     Ambulatory Referral/Consult to Physical Therapy    2. Mixed hyperlipidemia  Overview:  Crestor  Repeat labs in 3 months     Lab Results   Component Value Date    LDLCALC 103.6 04/19/2024           Orders:  -     rosuvastatin (CRESTOR) 20 MG tablet; Take 1 tablet (20 mg total) by mouth once daily.  Dispense: 30 tablet; Refill: 5      No follow-ups on file.

## 2025-08-15 DIAGNOSIS — N39.46 URGE AND STRESS INCONTINENCE: ICD-10-CM

## 2025-08-17 RX ORDER — OXYBUTYNIN CHLORIDE 10 MG/1
10 TABLET, EXTENDED RELEASE ORAL DAILY
Qty: 30 TABLET | Refills: 5 | Status: SHIPPED | OUTPATIENT
Start: 2025-08-17

## 2025-08-30 DIAGNOSIS — I10 PRIMARY HYPERTENSION: ICD-10-CM

## 2025-08-31 RX ORDER — OLMESARTAN MEDOXOMIL 20 MG/1
20 TABLET ORAL DAILY
Qty: 90 TABLET | Refills: 2 | Status: SHIPPED | OUTPATIENT
Start: 2025-08-31

## (undated) DEVICE — TRAY CATH 1-LYR URIMTR 16FR

## (undated) DEVICE — ELECTRODE REM PLYHSV RETURN 9

## (undated) DEVICE — BOVIE SUCTION

## (undated) DEVICE — BANDAGE BULKEE II 2.25INX3YD

## (undated) DEVICE — REMOVER STAPLE SKIN STERILE

## (undated) DEVICE — PAD ABDOMINAL STERILE 8X10IN

## (undated) DEVICE — MANIFOLD 4 PORT

## (undated) DEVICE — SUT SILK 2-0 PS 18IN BLACK

## (undated) DEVICE — STAPLER SKIN ROTATING HEAD

## (undated) DEVICE — SPONGE LAP 18X18 PREWASHED

## (undated) DEVICE — BLADE SURG CARBON STEEL #10

## (undated) DEVICE — SUT MCRYL PLUS 4-0 PS2 27IN

## (undated) DEVICE — MARKER FN REG DUAL UTIL RULER

## (undated) DEVICE — TIP YANKAUERS BULB NO VENT

## (undated) DEVICE — GOWN AERO CHROME W/ TOWEL XL

## (undated) DEVICE — SUT MONOCRYL 3-0 PS-2 UND

## (undated) DEVICE — NDL SPINAL SPINOCAN 22GX3.5

## (undated) DEVICE — APPLIER CLIP LIAGCLIP 9.375IN

## (undated) DEVICE — BANDAGE ROLL COTTN 4.5INX4.1YD

## (undated) DEVICE — DRAPE HALF SURGICAL 40X58IN

## (undated) DEVICE — EVACUATOR WOUND BULB 100CC

## (undated) DEVICE — TRAY MINOR GEN SURG OMC

## (undated) DEVICE — BLADE SURG #15 CARBON STEEL

## (undated) DEVICE — DRAIN CHANNEL ROUND 15FR

## (undated) DEVICE — DRESSING XEROFORM NONADH 1X8IN

## (undated) DEVICE — Device

## (undated) DEVICE — SYR 30CC LUER LOCK